# Patient Record
Sex: MALE | Race: WHITE | NOT HISPANIC OR LATINO | Employment: OTHER | URBAN - METROPOLITAN AREA
[De-identification: names, ages, dates, MRNs, and addresses within clinical notes are randomized per-mention and may not be internally consistent; named-entity substitution may affect disease eponyms.]

---

## 2017-01-17 ENCOUNTER — OFFICE VISIT (OUTPATIENT)
Dept: INTERNAL MEDICINE | Facility: MEDICAL CENTER | Age: 67
End: 2017-01-17
Payer: MEDICAID

## 2017-01-17 VITALS
HEIGHT: 70 IN | TEMPERATURE: 98.2 F | OXYGEN SATURATION: 96 % | RESPIRATION RATE: 16 BRPM | HEART RATE: 88 BPM | DIASTOLIC BLOOD PRESSURE: 77 MMHG | SYSTOLIC BLOOD PRESSURE: 119 MMHG | WEIGHT: 206 LBS | BODY MASS INDEX: 29.49 KG/M2

## 2017-01-17 DIAGNOSIS — F51.01 PRIMARY INSOMNIA: ICD-10-CM

## 2017-01-17 DIAGNOSIS — Z72.0 TOBACCO ABUSE: ICD-10-CM

## 2017-01-17 DIAGNOSIS — F41.9 ANXIETY: ICD-10-CM

## 2017-01-17 DIAGNOSIS — F32.A DEPRESSION, UNSPECIFIED DEPRESSION TYPE: ICD-10-CM

## 2017-01-17 DIAGNOSIS — R63.8 INCREASED BMI: ICD-10-CM

## 2017-01-17 DIAGNOSIS — F25.9 SCHIZOAFFECTIVE DISORDER, UNSPECIFIED TYPE (HCC): ICD-10-CM

## 2017-01-17 PROCEDURE — 99214 OFFICE O/P EST MOD 30 MIN: CPT | Mod: GC | Performed by: INTERNAL MEDICINE

## 2017-01-17 RX ORDER — NICOTINE 21 MG/24HR
1 PATCH, TRANSDERMAL 24 HOURS TRANSDERMAL EVERY 24 HOURS
Qty: 14 PATCH | Refills: 0 | Status: SHIPPED | OUTPATIENT
Start: 2017-01-17 | End: 2017-05-05

## 2017-01-17 RX ORDER — TRAZODONE HYDROCHLORIDE 50 MG/1
50 TABLET ORAL
Qty: 30 TAB | Refills: 3 | Status: SHIPPED | OUTPATIENT
Start: 2017-01-17 | End: 2017-11-22 | Stop reason: SDUPTHER

## 2017-01-17 NOTE — PROGRESS NOTES
Established Patient    Boyd presents today with the following:    CC: Follow up     HPI: Mr. Askew is a 66-year-old male with past medical history of depression, anxiety, schizoaffective disorder and osteomyelitis, COPD and tobacco abuse who presents today for follow up visit. His main complaint for today is insomnia. He is currently on trazodone 25 mg QHS and not helping with his insomnia. He stopped smoking but currently on the 21 mg nicotine patches and interested in downgrading to the 14 mg patches. Patient also feels he needs to talk to a psychologist about his mental problems. He denied any suicide or homicide ideation. Patient otherwise denied any complaints for today.   Off note patient recently admitted to ED because of COPD exacerbation and responded well to treatment with azithromycin, steroids and bronchodilators.     Patient Active Problem List    Diagnosis Date Noted   • Foot osteomyelitis, right (CMS-HCC) 06/21/2016     Priority: High   • Foot ulcer (CMS-HCC) 06/16/2016     Priority: Medium   • Acute sinusitis 10/31/2016   • PFO (patent foramen ovale) 07/06/2016   • Schizoaffective disorder (CMS-HCC) 06/16/2016   • Hypertension 02/16/2016   • Altered mental status 02/15/2016   • Depression 01/08/2016   • Dilated cardiomyopathy (CMS-HCC) 07/30/2015   • Acute systolic heart failure (CMS-HCC) 07/25/2015   • Mitral regurgitation 07/25/2015   • GERD (gastroesophageal reflux disease) 11/01/2013   • ED (erectile dysfunction) 03/08/2012   • Knee pain 03/08/2012   • Elevated fasting blood sugar 04/22/2011   • Anxiety 03/17/2010   • Chronic back pain 01/05/2010   • Venous insufficiency        Current Outpatient Prescriptions   Medication Sig Dispense Refill   • trazodone (DESYREL) 50 MG Tab Take 1 Tab by mouth every bedtime. 30 Tab 3   • nicotine (NICODERM) 14 MG/24HR PATCH 24 HR Apply 1 Patch to skin as directed every 24 hours. 14 Patch 0   • albuterol (VENTOLIN HFA) 108 (90 BASE) MCG/ACT Aero Soln  "inhalation aerosol Inhale 2 Puffs by mouth every four hours as needed for Shortness of Breath (wheezing and breathing). 1 Inhaler 2   • sertraline (ZOLOFT) 100 MG Tab TAKE TWO TABLETS BY MOUTH ONCE DAILY 60 Tab 4   • sildenafil citrate (VIAGRA) 25 MG Tab Take 1 Tab by mouth as needed for Erectile Dysfunction. 10 Tab 0   • gabapentin (NEURONTIN) 300 MG Cap 1 tab po qd for 1 day then 1 tab BID for 1 day then 1 tab PO TID 90 Cap 3   • Lactobacillus-Inulin ([facility] DIG[facility]) Cap Take 1 Cap by mouth every day with lunch. 30 Cap 4   • Specialty Vitamins Products (HEALTHY HEART COMPLEX) Tab Take  by mouth.     • buPROPion SR (WELLBUTRIN-SR) 100 MG TABLET SR 12 HR Take 1 Tab by mouth every day. 60 Tab 3     No current facility-administered medications for this visit.           Review of Systems:     Constitutional: Denies fevers, Denies weight changes  Eyes: Denies changes in vision, no eye pain  Ears/Nose/Throat/Mouth: Denies nasal congestion or sore throat   Cardiovascular: Denies chest pain or palpitations   Respiratory: Denies shortness of breath , Denies cough  Gastrointestinal/Hepatic: Denies abdominal pain, nausea, vomiting, diarrhea or constipation.  Genitourinary: Denies bladder dysfunction, dysuria or frequency  Musculoskeletal/Rheum: chronic back pain   Skin: Denies rash.  Neurological: Denies headache, confusion, memory loss or focal weakness/parasthesias  Psychiatric: denies mood disorder               /77 mmHg  Pulse 88  Temp(Src) 36.8 °C (98.2 °F)  Resp 16  Ht 1.778 m (5' 10\")  Wt 93.441 kg (206 lb)  BMI 29.56 kg/m2  SpO2 96%    Physical Exam   Constitutional:  Comfortable. No acute distress.   Eyes: Pupils are equal, round, and reactive to light. No scleral icterus.  Neck: Neck supple. No thyromegaly present.   Cardiovascular: Normal rate, regular rhythm and normal heart sounds.  Exam reveals no gallop and no friction rub.  No murmur heard.  Pulmonary/Chest: Lungs clear to ascultation " bilaterally. Breath sounds normal. No rhonchi, wheezes or crackles.   Musculoskeletal:   No deformity noted. no edema.   Lymphadenopathy: no cervical adenopathy  Neurological: alert and oriented to person, place, and time. Cranial nerves 2-12 grossly intact. No obvious motor or sensory deficits.  Extremities: No edema. No clubbing. No cyanosis. Distal pulses 2+ bilaterally.  Skin: No Rash. No cyanosis. Nails show no clubbing.      Assessment and Plan    1. Primary insomnia  - Initial insomnia.  - Acceptable Sleep hygiene.  - Currently on trazodone 25 QHS. Will increase dose to 50 QHS and titrate up as needed and tolerated.    2. Schizoaffective disorder, unspecified type (CMS-HCC)  3. Anxiety  4. Depression, unspecified depression type  - Patient currently on Zoloft and Bupropion  - No suicidal or homicidal ideation  - Will refer to behavioral health for counseling.    5. Tobacco abuse  - Patient quit smoking and currently on 21 mg nicotine patches.  - Will start on 14 mg patches.  - Counseling provided.        Signed by: Espinoza Doshi M.D.

## 2017-01-17 NOTE — MR AVS SNAPSHOT
"Boyd Askew   2017 10:00 AM   Office Visit   MRN: [id number]    Department:  [facility]   Dept Phone:  [phone]    Description:  Male : 1950   Provider:  Espinoza Doshi M.D.           Reason for Visit     Referral Needed psychologist/psychiatrist      Allergies as of 2017     No Known Allergies      You were diagnosed with     Primary insomnia   [391719]       Schizoaffective disorder, unspecified type (CMS-[facility])   [1548498]       Anxiety   [307346]       Depression, unspecified depression type   [0332911]       Tobacco abuse   [[id number]]         Vital Signs     Blood Pressure Pulse Temperature Respirations Height Weight    119/77 mmHg 88 36.8 °C (98.2 °F) 16 1.778 m (5' 10\") 93.441 kg (206 lb)    Body Mass Index Oxygen Saturation Smoking Status             29.56 kg/m2 96% Current Some Day Smoker         Basic Information     Date Of Birth Sex Race Ethnicity Preferred Language    1950 Male White Non- English      Problem List              ICD-10-CM Priority Class Noted - Resolved    Venous insufficiency I87.2   Unknown - Present    Chronic back pain M54.9, G89.29   2010 - Present    Anxiety F41.9   3/17/2010 - Present    Elevated fasting blood sugar R73.01   2011 - Present    ED (erectile dysfunction) N52.9   3/8/2012 - Present    Knee pain M25.569   3/8/2012 - Present    GERD (gastroesophageal reflux disease) K21.9   2013 - Present    Acute systolic heart failure (CMS-[facility]) I50.21   2015 - Present    Mitral regurgitation I34.0   2015 - Present    Dilated cardiomyopathy (CMS-HCC) I42.0   2015 - Present    Depression F32.9   2016 - Present    Altered mental status R41.82   2/15/2016 - Present    Hypertension I10   2016 - Present    Foot ulcer (CMS-HCC) L97.509 Medium  2016 - Present    Schizoaffective disorder (CMS-HCC) F25.9   2016 - Present    Foot osteomyelitis, right (CMS-HCC) M86.9 High  2016 - " Present    PFO (patent foramen ovale) Q21.1   7/6/2016 - Present    Acute sinusitis J01.90   10/31/2016 - Present      Health Maintenance        Date Due Completion Dates    IMM DTaP/Tdap/Td Vaccine (1 - Tdap) 8/18/1971 ---    IMM ZOSTER VACCINE 8/18/2012 ---    COLONOSCOPY 9/14/2019 9/14/2016            Current Immunizations     Influenza TIV (IM) 9/11/2016, 10/8/2015, 10/1/2014    Influenza Vaccine Quad Inj (Pf) 11/16/2015    Pneumococcal polysaccharide vaccine (PPSV-23) 7/22/2015  4:41 PM      Below and/or attached are the medications your provider expects you to take. Review all of your home medications and newly ordered medications with your provider and/or pharmacist. Follow medication instructions as directed by your provider and/or pharmacist. Please keep your medication list with you and share with your provider. Update the information when medications are discontinued, doses are changed, or new medications (including over-the-counter products) are added; and carry medication information at all times in the event of emergency situations     Allergies:  No Known Allergies          Medications  Valid as of: January 17, 2017 - 10:14 AM    Generic Name Brand Name Tablet Size Instructions for use    Albuterol Sulfate (Aero Soln) albuterol 108 (90 BASE) MCG/ACT Inhale 2 Puffs by mouth every four hours as needed for Shortness of Breath (wheezing and breathing).        BuPROPion HCl (TABLET SR 12 HR) WELLBUTRIN- MG Take 1 Tab by mouth every day.        Gabapentin (Cap) NEURONTIN 300 MG 1 tab po qd for 1 day then 1 tab BID for 1 day then 1 tab PO TID        Lactobacillus-Inulin (Cap) CULTURELLE DIGESTIVE [facility]  Take 1 Cap by mouth every day with lunch.        Nicotine (PATCH 24 HR) NICODERM 14 MG/24HR Apply 1 Patch to skin as directed every 24 hours.        Sertraline HCl (Tab) ZOLOFT 100 MG TAKE TWO TABLETS BY MOUTH ONCE DAILY        Sildenafil Citrate (Tab) VIAGRA 25 MG Take 1 Tab by mouth as needed for  Erectile Dysfunction.        Specialty Vitamins Products (Tab) HEALTHY HEART COMPLEX  Take  by mouth.        TraZODone HCl (Tab) DESYREL 50 MG Take 1 Tab by mouth every bedtime.        .                 Medicines prescribed today were sent to:     [id number] - [place], [place] - [address]    [address] [place] [address]    Phone: [phone] Fax: [phone]    Open 24 Hours?: No      Medication refill instructions:       If your prescription bottle indicates you have medication refills left, it is not necessary to call your provider’s office. Please contact your pharmacy and they will refill your medication.    If your prescription bottle indicates you do not have any refills left, you may request refills at any time through one of the following ways: The online [employer] system (except Urgent Care), by calling your provider’s office, or by asking your pharmacy to contact your provider’s office with a refill request. Medication refills are processed only during regular business hours and may not be available until the next business day. Your provider may request additional information or to have a follow-up visit with you prior to refilling your medication.   *Please Note: Medication refills are assigned a new Rx number when refilled electronically. Your pharmacy may indicate that no refills were authorized even though a new prescription for the same medication is available at the pharmacy. Please request the medicine by name with the pharmacy before contacting your provider for a refill.        Referral     A referral request has been sent to our patient care coordination department. Please allow 3-5 business days for us to process this request and contact you either by phone or mail. If you do not hear from us by the 5th business day, please call us at [phone].        Other Notes About Your Plan     Drug contract signed 9/23/14  Urine drug screen collected 12/17/14           [employer]  Access Code: Activation code not generated  Current MyChart Status: Active

## 2017-02-10 DIAGNOSIS — F51.01 PRIMARY INSOMNIA: ICD-10-CM

## 2017-02-10 NOTE — TELEPHONE ENCOUNTER
Last seen: 01/17/17 by Dr. Doshi  Next appt: None     Was the patient seen in the last year in this department? Yes   Does patient have an active prescription for medications requested? No   Received Request Via: Pharmacy      Pt has doubled dose was told he could do so by

## 2017-02-16 RX ORDER — TRAZODONE HYDROCHLORIDE 50 MG/1
50 TABLET ORAL 2 TIMES DAILY
Qty: 60 TAB | Refills: 0 | OUTPATIENT
Start: 2017-02-16

## 2017-02-25 DIAGNOSIS — G89.29 OTHER CHRONIC PAIN: ICD-10-CM

## 2017-02-27 RX ORDER — GABAPENTIN 300 MG/1
300 CAPSULE ORAL 3 TIMES DAILY
Qty: 90 CAP | Refills: 5 | Status: SHIPPED | OUTPATIENT
Start: 2017-02-27 | End: 2017-09-18 | Stop reason: SDUPTHER

## 2017-02-27 NOTE — TELEPHONE ENCOUNTER
Last seen: 1/17/17 by Dr. Doshi  Next appt: None    Was the patient seen in the last year in this department? Yes   Does patient have an active prescription for medications requested? No   Received Request Via: Pharmacy

## 2017-03-03 ENCOUNTER — OFFICE VISIT (OUTPATIENT)
Dept: INTERNAL MEDICINE | Facility: MEDICAL CENTER | Age: 67
End: 2017-03-03
Payer: MEDICAID

## 2017-03-03 ENCOUNTER — TELEPHONE (OUTPATIENT)
Dept: INTERNAL MEDICINE | Facility: MEDICAL CENTER | Age: 67
End: 2017-03-03

## 2017-03-03 VITALS
HEIGHT: 70 IN | TEMPERATURE: 98.3 F | BODY MASS INDEX: 30.58 KG/M2 | OXYGEN SATURATION: 96 % | DIASTOLIC BLOOD PRESSURE: 80 MMHG | WEIGHT: 213.6 LBS | SYSTOLIC BLOOD PRESSURE: 126 MMHG | HEART RATE: 89 BPM

## 2017-03-03 DIAGNOSIS — G89.29 CHRONIC PAIN OF RIGHT KNEE: ICD-10-CM

## 2017-03-03 DIAGNOSIS — M25.561 CHRONIC PAIN OF RIGHT KNEE: ICD-10-CM

## 2017-03-03 DIAGNOSIS — L98.9 ECZEMATOUS SKIN LESIONS: ICD-10-CM

## 2017-03-03 DIAGNOSIS — F17.209 NICOTINE DEPENDENCE WITH NICOTINE-INDUCED DISORDER, UNSPECIFIED NICOTINE PRODUCT TYPE: ICD-10-CM

## 2017-03-03 PROBLEM — F17.200 NICOTINE DEPENDENCE: Status: ACTIVE | Noted: 2017-03-03

## 2017-03-03 PROCEDURE — 99213 OFFICE O/P EST LOW 20 MIN: CPT | Mod: GE | Performed by: INTERNAL MEDICINE

## 2017-03-03 RX ORDER — TRIAMCINOLONE ACETONIDE 1 MG/G
CREAM TOPICAL
Qty: 1 TUBE | Refills: 1 | Status: SHIPPED | OUTPATIENT
Start: 2017-03-03 | End: 2017-05-05

## 2017-03-03 RX ORDER — QUETIAPINE FUMARATE 100 MG/1
100 TABLET, FILM COATED ORAL 4 TIMES DAILY PRN
COMMUNITY
End: 2017-05-05

## 2017-03-03 NOTE — PATIENT INSTRUCTIONS
F/u with pcp as scheduled  Soaked skin with luke warm water and then use a wash cloth to scrub gently, pat dry it and cover with thick vaseline gelly.    Eczema  Eczema, also called atopic dermatitis, is a skin disorder that causes inflammation of the skin. It causes a red rash and dry, scaly skin. The skin becomes very itchy. Eczema is generally worse during the cooler winter months and often improves with the warmth of summer. Eczema usually starts showing signs in infancy. Some children outgrow eczema, but it may last through adulthood.   CAUSES   The exact cause of eczema is not known, but it appears to run in families. People with eczema often have a family history of eczema, allergies, asthma, or hay fever. Eczema is not contagious.  Flare-ups of the condition may be caused by:   · Contact with something you are sensitive or allergic to.    · Stress.  SIGNS AND SYMPTOMS  · Dry, scaly skin.    · Red, itchy rash.    · Itchiness. This may occur before the skin rash and may be very intense.    DIAGNOSIS   The diagnosis of eczema is usually made based on symptoms and medical history.  TREATMENT   Eczema cannot be cured, but symptoms usually can be controlled with treatment and other strategies. A treatment plan might include:  · Controlling the itching and scratching.    ¨ Use over-the-counter antihistamines as directed for itching. This is especially useful at night when the itching tends to be worse.    ¨ Use over-the-counter steroid creams as directed for itching.    ¨ Avoid scratching. Scratching makes the rash and itching worse. It may also result in a skin infection (impetigo) due to a break in the skin caused by scratching.    · Keeping the skin well moisturized with creams every day. This will seal in moisture and help prevent dryness. Lotions that contain alcohol and water should be avoided because they can dry the skin.    · Limiting exposure to things that you are sensitive or allergic to (allergens).     · Recognizing situations that cause stress.    · Developing a plan to manage stress.    HOME CARE INSTRUCTIONS   · Only take over-the-counter or prescription medicines as directed by your health care provider.    · Do not use anything on the skin without checking with your health care provider.    · Keep baths or showers short (5 minutes) in warm (not hot) water. Use mild cleansers for bathing. These should be unscented. You may add nonperfumed bath oil to the bath water. It is best to avoid soap and bubble bath.    · Immediately after a bath or shower, when the skin is still damp, apply a moisturizing ointment to the entire body. This ointment should be a petroleum ointment. This will seal in moisture and help prevent dryness. The thicker the ointment, the better. These should be unscented.    · Keep fingernails cut short. Children with eczema may need to wear soft gloves or mittens at night after applying an ointment.    · Dress in clothes made of cotton or cotton blends. Dress lightly, because heat increases itching.    · A child with eczema should stay away from anyone with fever blisters or cold sores. The virus that causes fever blisters (herpes simplex) can cause a serious skin infection in children with eczema.  SEEK MEDICAL CARE IF:   · Your itching interferes with sleep.    · Your rash gets worse or is not better within 1 week after starting treatment.    · You see pus or soft yellow scabs in the rash area.    · You have a fever.    · You have a rash flare-up after contact with someone who has fever blisters.       This information is not intended to replace advice given to you by your health care provider. Make sure you discuss any questions you have with your health care provider.     Document Released: 12/15/2001 Document Revised: 10/08/2014 Document Reviewed: 07/21/2014  [employer] Interactive Patient Education ©2016 [employer]

## 2017-03-03 NOTE — MR AVS SNAPSHOT
"Boyd Askew   3/3/2017 11:15 AM   Office Visit   MRN: [id number]    Department:  [facility]   Dept Phone:  [phone]    Description:  Male : 1950   Provider:  Nisa Crocker M.D.           Reason for Visit     Knee Swelling Dr. Sesay pt     Wound Check     Nicotine Dependence Rx for gum       Allergies as of 3/3/2017     No Known Allergies      You were diagnosed with     Chronic pain of right knee   [4644636]       Nicotine dependence with nicotine-induced disorder, unspecified nicotine product type   [8349226]       Tobacco abuse   [531706]       Eczematous skin lesions   [325318]         Vital Signs     Blood Pressure Pulse Temperature Height Weight Body Mass Index    126/80 mmHg 89 36.8 °C (98.3 °F) 1.778 m (5' 10\") 96.888 kg (213 lb 9.6 oz) 30.65 kg/m2    Oxygen Saturation Smoking Status                96% Current Some Day Smoker          Basic Information     Date Of Birth Sex Race Ethnicity Preferred Language    1950 Male White Non- English      Problem List              ICD-10-CM Priority Class Noted - Resolved    Venous insufficiency I87.2   Unknown - Present    Chronic back pain M54.9, G89.29   2010 - Present    Anxiety F41.9   3/17/2010 - Present    Elevated fasting blood sugar R73.01   2011 - Present    ED (erectile dysfunction) N52.9   3/8/2012 - Present    Chronic pain of right knee M25.561, G89.29   3/8/2012 - Present    GERD (gastroesophageal reflux disease) K21.9   2013 - Present    Acute systolic heart failure (CMS-HCC) I50.21   2015 - Present    Mitral regurgitation I34.0   2015 - Present    Dilated cardiomyopathy (CMS-HCC) I42.0   2015 - Present    Depression F32.9   2016 - Present    Altered mental status R41.82   2/15/2016 - Present    Hypertension I10   2016 - Present    Foot ulcer (CMS-HCC) L97.509 Medium  2016 - Present    Schizoaffective disorder (CMS-HCC) F25.9   2016 - Present   " Foot osteomyelitis, right (CMS-HCC) M86.9 High  6/21/2016 - Present    PFO (patent foramen ovale) Q21.1   7/6/2016 - Present    Acute sinusitis J01.90   10/31/2016 - Present    Nicotine dependence F17.200   3/3/2017 - Present      Health Maintenance        Date Due Completion Dates    IMM DTaP/Tdap/Td Vaccine (1 - Tdap) 8/18/1971 ---    IMM ZOSTER VACCINE 8/18/2012 ---    COLONOSCOPY 9/14/2019 9/14/2016            Current Immunizations     Influenza TIV (IM) 9/11/2016, 10/8/2015, 10/1/2014    Influenza Vaccine Quad Inj (Pf) 11/16/2015    Pneumococcal polysaccharide vaccine (PPSV-23) 7/22/2015  4:41 PM      Below and/or attached are the medications your provider expects you to take. Review all of your home medications and newly ordered medications with your provider and/or pharmacist. Follow medication instructions as directed by your provider and/or pharmacist. Please keep your medication list with you and share with your provider. Update the information when medications are discontinued, doses are changed, or new medications (including over-the-counter products) are added; and carry medication information at all times in the event of emergency situations     Allergies:  No Known Allergies          Medications  Valid as of: March 03, 2017 - 12:05 PM    Generic Name Brand Name Tablet Size Instructions for use    Albuterol Sulfate (Aero Soln) albuterol 108 (90 BASE) MCG/ACT Inhale 2 Puffs by mouth every four hours as needed for Shortness of Breath (wheezing and breathing).        BuPROPion HCl (TABLET SR 12 HR) WELLBUTRIN- MG Take 1 Tab by mouth every day.        Gabapentin (Cap) NEURONTIN 300 MG Take 1 Cap by mouth 3 times a day.        Lactobacillus-Inulin (Cap) CULTURELLE DIGESTIVE [facility]  Take 1 Cap by mouth every day with lunch.        Nicotine (PATCH 24 HR) NICODERM 14 MG/24HR Apply 1 Patch to skin as directed every 24 hours.        Nicotine Polacrilex (Gum) NICORETTE 4 MG Use every day as needed basis         QUEtiapine Fumarate (Tab) SEROQUEL 100 MG Take 100 mg by mouth 4 times a day as needed.        Sertraline HCl (Tab) ZOLOFT 100 MG TAKE TWO TABLETS BY MOUTH ONCE DAILY        Sildenafil Citrate (Tab) VIAGRA 25 MG Take 1 Tab by mouth as needed for Erectile Dysfunction.        Specialty Vitamins Products (Tab) HEALTHY HEART COMPLEX  Take  by mouth.        TraZODone HCl (Tab) DESYREL 50 MG Take 1 Tab by mouth every bedtime.        Triamcinolone Acetonide (Cream) KENALOG 0.1 % Apply thin layer over the skin 2 times a dya.        .                 Medicines prescribed today were sent to:     [id number] - [place], [place] - [address]    [address] [place] [address]    Phone: [phone] Fax: [phone]    Open 24 Hours?: No      Medication refill instructions:       If your prescription bottle indicates you have medication refills left, it is not necessary to call your provider’s office. Please contact your pharmacy and they will refill your medication.    If your prescription bottle indicates you do not have any refills left, you may request refills at any time through one of the following ways: The online [employer] system (except Urgent Care), by calling your provider’s office, or by asking your pharmacy to contact your provider’s office with a refill request. Medication refills are processed only during regular business hours and may not be available until the next business day. Your provider may request additional information or to have a follow-up visit with you prior to refilling your medication.   *Please Note: Medication refills are assigned a new Rx number when refilled electronically. Your pharmacy may indicate that no refills were authorized even though a new prescription for the same medication is available at the pharmacy. Please request the medicine by name with the pharmacy before contacting your provider for a refill.        Referral     A referral request has been sent to our  patient care coordination department. Please allow 3-5 business days for us to process this request and contact you either by phone or mail. If you do not hear from us by the 5th business day, please call us at [phone].        Instructions    F/u with pcp as scheduled  Soaked skin with luke warm water and then use a wash cloth to scrub gently, pat dry it and cover with thick vaseline gelly.    Eczema  Eczema, also called atopic dermatitis, is a skin disorder that causes inflammation of the skin. It causes a red rash and dry, scaly skin. The skin becomes very itchy. Eczema is generally worse during the cooler winter months and often improves with the warmth of summer. Eczema usually starts showing signs in infancy. Some children outgrow eczema, but it may last through adulthood.   CAUSES   The exact cause of eczema is not known, but it appears to run in families. People with eczema often have a family history of eczema, allergies, asthma, or hay fever. Eczema is not contagious.  Flare-ups of the condition may be caused by:   · Contact with something you are sensitive or allergic to.    · Stress.  SIGNS AND SYMPTOMS  · Dry, scaly skin.    · Red, itchy rash.    · Itchiness. This may occur before the skin rash and may be very intense.    DIAGNOSIS   The diagnosis of eczema is usually made based on symptoms and medical history.  TREATMENT   Eczema cannot be cured, but symptoms usually can be controlled with treatment and other strategies. A treatment plan might include:  · Controlling the itching and scratching.    ¨ Use over-the-counter antihistamines as directed for itching. This is especially useful at night when the itching tends to be worse.    ¨ Use over-the-counter steroid creams as directed for itching.    ¨ Avoid scratching. Scratching makes the rash and itching worse. It may also result in a skin infection (impetigo) due to a break in the skin caused by scratching.    · Keeping the skin well moisturized  with creams every day. This will seal in moisture and help prevent dryness. Lotions that contain alcohol and water should be avoided because they can dry the skin.    · Limiting exposure to things that you are sensitive or allergic to (allergens).    · Recognizing situations that cause stress.    · Developing a plan to manage stress.    HOME CARE INSTRUCTIONS   · Only take over-the-counter or prescription medicines as directed by your health care provider.    · Do not use anything on the skin without checking with your health care provider.    · Keep baths or showers short (5 minutes) in warm (not hot) water. Use mild cleansers for bathing. These should be unscented. You may add nonperfumed bath oil to the bath water. It is best to avoid soap and bubble bath.    · Immediately after a bath or shower, when the skin is still damp, apply a moisturizing ointment to the entire body. This ointment should be a petroleum ointment. This will seal in moisture and help prevent dryness. The thicker the ointment, the better. These should be unscented.    · Keep fingernails cut short. Children with eczema may need to wear soft gloves or mittens at night after applying an ointment.    · Dress in clothes made of cotton or cotton blends. Dress lightly, because heat increases itching.    · A child with eczema should stay away from anyone with fever blisters or cold sores. The virus that causes fever blisters (herpes simplex) can cause a serious skin infection in children with eczema.  SEEK MEDICAL CARE IF:   · Your itching interferes with sleep.    · Your rash gets worse or is not better within 1 week after starting treatment.    · You see pus or soft yellow scabs in the rash area.    · You have a fever.    · You have a rash flare-up after contact with someone who has fever blisters.       This information is not intended to replace advice given to you by your health care provider. Make sure you discuss any questions you have with  your health care provider.     Document Released: 12/15/2001 Document Revised: 10/08/2014 Document Reviewed: 07/21/2014  Elsevier Interactive Patient Education ©2016 [employer]         Other Notes About Your Plan     Drug contract signed 9/23/14  Urine drug screen collected 12/17/14           MyChart Access Code: Activation code not generated  Current [facility] Status: Active

## 2017-03-03 NOTE — PROGRESS NOTES
Established Patient    Boyd presents today with the following:    CC: Knee swelling and skin lesion  HPI: 65 yo  with hx of schizophrenia, nicotine dependence came to today for worsening R knee swelling. He mentioned that he had an arthroscopy with s/p removal of meniscus >10 yrs ago.  was the surgeon. He gets on and off flare of the swelling since then. For last 2 yrs it got worse and recently it got bigger and painful. Swelling worsen with walking. No hx of recent trauma or fall. He has not following any orthopedic physician at the moment.    He also c/o worsening of eczema in the L hand since winter. It has been going on for last 20 yrs. He used to see  before for this and  he used to take one oral medication (ezlesta?) and steroid cream for this.    He requests for nicotine gum this visit. He still smoking 1-2 cigarettes a day.    Patient Active Problem List    Diagnosis Date Noted   • Foot osteomyelitis, right (CMS-HCC) 06/21/2016     Priority: High   • Foot ulcer (CMS-HCC) 06/16/2016     Priority: Medium   • Nicotine dependence 03/03/2017   • Acute sinusitis 10/31/2016   • PFO (patent foramen ovale) 07/06/2016   • Schizoaffective disorder (CMS-HCC) 06/16/2016   • Hypertension 02/16/2016   • Altered mental status 02/15/2016   • Depression 01/08/2016   • Dilated cardiomyopathy (CMS-HCC) 07/30/2015   • Acute systolic heart failure (CMS-HCC) 07/25/2015   • Mitral regurgitation 07/25/2015   • GERD (gastroesophageal reflux disease) 11/01/2013   • ED (erectile dysfunction) 03/08/2012   • Chronic pain of right knee 03/08/2012   • Elevated fasting blood sugar 04/22/2011   • Anxiety 03/17/2010   • Chronic back pain 01/05/2010   • Venous insufficiency        Current Outpatient Prescriptions   Medication Sig Dispense Refill   • quetiapine (SEROQUEL) 100 MG Tab Take 100 mg by mouth 4 times a day as needed.     • gabapentin (NEURONTIN) 300 MG Cap Take 1 Cap by mouth 3 times a day. 90  "Cap 5   • trazodone (DESYREL) 50 MG Tab Take 1 Tab by mouth every bedtime. 30 Tab 3   • nicotine (NICODERM) 14 MG/24HR PATCH 24 HR Apply 1 Patch to skin as directed every 24 hours. 14 Patch 0   • sertraline (ZOLOFT) 100 MG Tab TAKE TWO TABLETS BY MOUTH ONCE DAILY (Patient taking differently: TAKE 1 1/2  TABLETS BY MOUTH ONCE DAILY) 60 Tab 4   • buPROPion SR (WELLBUTRIN-SR) 100 MG TABLET SR 12 HR Take 1 Tab by mouth every day. 60 Tab 3   • albuterol (VENTOLIN HFA) 108 (90 BASE) MCG/ACT Aero Soln inhalation aerosol Inhale 2 Puffs by mouth every four hours as needed for Shortness of Breath (wheezing and breathing). 1 Inhaler 2   • sildenafil citrate (VIAGRA) 25 MG Tab Take 1 Tab by mouth as needed for Erectile Dysfunction. 10 Tab 0   • Lactobacillus-Inulin ([facility] DIGESTIVE [facility]) Cap Take 1 Cap by mouth every day with lunch. 30 Cap 4   • Specialty Vitamins Products (HEALTHY HEART COMPLEX) Tab Take  by mouth.       No current facility-administered medications for this visit.       ROS: As per HPI.     /80 mmHg  Pulse 89  Temp(Src) 36.8 °C (98.3 °F)  Ht 1.778 m (5' 10\")  Wt 96.888 kg (213 lb 9.6 oz)  BMI 30.65 kg/m2  SpO2 96%    Physical Exam   Constitutional:  Well developed  male. No distress.   Eyes: Pupils are equal, round, and reactive to light.   Neck: Neck supple.    Cardiovascular: Normal rate, regular rhythm and normal heart sounds.  Exam reveals no gallop and no friction rub.  No murmur heard.  Pulmonary/Chest: Breath sounds normal.   Musculoskeletal:   R Knee: Swollen, non tender Knee joint, Decreased ROM, especially flexion, No erythema in the overlying skin present.  Neurological: alert and oriented to person, place, and time.  Skin: Presence of dry, cracked skin with wart in the Left palm.      Assessment and Plan    1. Chronic pain of right knee  S/p meniscal removal >10 yrs back as per pt  Orthopedic surgeon retired.  No sign of infection on exam, decreased ROM.  Plan:  Ordered " ortho referral  Knee brace on walking  Advised OTC tylenol 650 mg q8h prn for pain or topical gel.    2. Nicotine dependence with nicotine-induced disorder, unspecified nicotine product type  Long hx of nicotine dependence  Still smoking, but cut down to 1-2 sticks/day  Ordered nicotine gum on request today    3. Eczematous skin lesions  Involved part: L hand  Plan:  Prescribed topical sterid: triamcinolone 0.1% cream  Advised to try home remedy: soaking hand in luke warm water with gentle rub w wash cloth and pat dry followed by greeze skin with Vaseline.  Dermatology referral made.    Followup: No Follow-up on file.      Signed by: Nisa Crocker M.D.

## 2017-03-03 NOTE — TELEPHONE ENCOUNTER
Outside labs reviewed including TSH which is normal and Vitamin D levels that is slightly decreased. I assume labs were reviewed and addressed by ordering provider[facility] (could not find any documentation about the reason). Patient will need to be on Vit D supplements which we will order if it is not ordered by Dr. Brunner.

## 2017-03-13 ENCOUNTER — TELEPHONE (OUTPATIENT)
Dept: INTERNAL MEDICINE | Facility: MEDICAL CENTER | Age: 67
End: 2017-03-13

## 2017-03-13 NOTE — TELEPHONE ENCOUNTER
1. Caller Name: Shikha from orthopedic office                       Call Back Number: [phone]    2. Message:  is not willing to see the patient if he is homeless they need to make sure he has transportation and if pt does get surgery he has to be in a clean and safe environment for post -op care     3. Patient approves office to leave a detailed voicemail/MyChart message: yes

## 2017-03-13 NOTE — Clinical Note
March 13, 2017         Boyd Askew  [address] [facility] [address]        Dear Boyd:      We were contacted by [facility]  About your recent referral. They want to make sure you have an appropriate environment in case you needed surgery before scheduling the appointment. They recommended that you call [facility] (P: [phone]  Jessi Delcid) to get some help in this regard.  Please let us know if we can be of any help.        Sincerely,      Espinoza Doshi M.D.    Electronically Signed

## 2017-03-13 NOTE — TELEPHONE ENCOUNTER
Contacted Shikha, the  for [employer]. They are concerned about the postoperative period care in case the patient needs surgery as he is homeless now. Tried to reach patient and his wife over the phone to explore situation but could not. Shikha recommended referring patient to [facility] (April Steward P: [phone]) please try to notify patient. A letter with the above info will be generated to mail to patient in case of inability to reach by phone.

## 2017-03-17 ENCOUNTER — TELEPHONE (OUTPATIENT)
Dept: INTERNAL MEDICINE | Facility: MEDICAL CENTER | Age: 67
End: 2017-03-17

## 2017-03-17 DIAGNOSIS — G89.29 CHRONIC PAIN OF RIGHT KNEE: ICD-10-CM

## 2017-03-17 DIAGNOSIS — M25.561 CHRONIC PAIN OF RIGHT KNEE: ICD-10-CM

## 2017-03-17 NOTE — TELEPHONE ENCOUNTER
1. Caller Name: pt                      Call Back Number: [phone] (home)     2. Message:  requesting pt to get order of right knee x-ray     3. Patient approves office to leave a detailed voicemail/MyChart message: yes      Pt has called neurosurgeon back and let them know he is not homeless and would like to proceed with appt

## 2017-03-21 NOTE — TELEPHONE ENCOUNTER
Pt called and L/M asking if this was ordered. I called and L/M to let him know it was ordered on 3/17/17 and he can walk in to have done at any [facility] location or call back if it needs to be faxed outside of [facility].

## 2017-04-10 ENCOUNTER — APPOINTMENT (OUTPATIENT)
Dept: LAB | Facility: MEDICAL CENTER | Age: 67
End: 2017-04-10
Attending: PSYCHIATRY & NEUROLOGY
Payer: MEDICAID

## 2017-04-26 NOTE — PROGRESS NOTES
Quick Note:    Xray of right knee showing osteoarthritis of right patellofemoral joint. Xray was requested by ortho, Dr. Padilla. Will wait for his recommendations. Meanwhile will have the patient continue OTC pain meds like ibuprofen and tylenol. Patient notified through my chart.  ______

## 2017-05-05 ENCOUNTER — OFFICE VISIT (OUTPATIENT)
Dept: INTERNAL MEDICINE | Facility: MEDICAL CENTER | Age: 67
End: 2017-05-05
Payer: MEDICAID

## 2017-05-05 VITALS
HEIGHT: 70 IN | OXYGEN SATURATION: 96 % | HEART RATE: 87 BPM | BODY MASS INDEX: 30.92 KG/M2 | SYSTOLIC BLOOD PRESSURE: 120 MMHG | DIASTOLIC BLOOD PRESSURE: 77 MMHG | TEMPERATURE: 98.8 F | WEIGHT: 216 LBS | RESPIRATION RATE: 18 BRPM

## 2017-05-05 DIAGNOSIS — E78.5 DYSLIPIDEMIA: ICD-10-CM

## 2017-05-05 DIAGNOSIS — G89.29 CHRONIC PAIN OF RIGHT KNEE: ICD-10-CM

## 2017-05-05 DIAGNOSIS — L40.0 PLAQUE PSORIASIS: ICD-10-CM

## 2017-05-05 DIAGNOSIS — F32.A ANXIETY AND DEPRESSION: ICD-10-CM

## 2017-05-05 DIAGNOSIS — Z87.39 HISTORY OF OSTEOMYELITIS: ICD-10-CM

## 2017-05-05 DIAGNOSIS — M54.50 CHRONIC MIDLINE LOW BACK PAIN WITHOUT SCIATICA: ICD-10-CM

## 2017-05-05 DIAGNOSIS — M25.561 CHRONIC PAIN OF RIGHT KNEE: ICD-10-CM

## 2017-05-05 DIAGNOSIS — R73.03 PREDIABETES: ICD-10-CM

## 2017-05-05 DIAGNOSIS — G89.29 CHRONIC MIDLINE LOW BACK PAIN WITHOUT SCIATICA: ICD-10-CM

## 2017-05-05 DIAGNOSIS — Z72.0 TOBACCO ABUSE: ICD-10-CM

## 2017-05-05 DIAGNOSIS — F41.9 ANXIETY AND DEPRESSION: ICD-10-CM

## 2017-05-05 PROCEDURE — 99214 OFFICE O/P EST MOD 30 MIN: CPT | Mod: GC | Performed by: INTERNAL MEDICINE

## 2017-05-05 RX ORDER — BETAMETHASONE DIPROPIONATE 0.05 %
OINTMENT (GRAM) TOPICAL
Qty: 3 TUBE | Refills: 4 | Status: SHIPPED | OUTPATIENT
Start: 2017-05-05 | End: 2018-10-22

## 2017-05-05 RX ORDER — SERTRALINE HYDROCHLORIDE 100 MG/1
100 TABLET, FILM COATED ORAL DAILY
Qty: 60 TAB | Refills: 4
Start: 2017-05-05 | End: 2017-10-25 | Stop reason: SDUPTHER

## 2017-05-05 RX ORDER — IBUPROFEN 600 MG/1
600 TABLET ORAL EVERY 8 HOURS PRN
Qty: 30 TAB | Refills: 3 | Status: SHIPPED | OUTPATIENT
Start: 2017-05-05 | End: 2017-06-23 | Stop reason: SDUPTHER

## 2017-05-05 NOTE — MR AVS SNAPSHOT
"        Boyd Askew   2017 8:30 AM   Office Visit   MRN: [id number]    Department:  [facility]   Dept Phone:  [phone]    Description:  Male : 1950   Provider:  Espinoza Doshi M.D.           Reason for Visit     Hand Pain pain both x 5 months      Allergies as of 2017     No Known Allergies      You were diagnosed with     Plaque psoriasis   [803936]       History of osteomyelitis   [007768]       Anxiety and depression   [186845]       Chronic midline low back pain without sciatica   [3037677]       Prediabetes   [053406]       Chronic pain of right knee   [8844190]       Tobacco abuse   [634464]       Dyslipidemia   [725957]         Vital Signs     Blood Pressure Pulse Temperature Respirations Height Weight    120/77 mmHg 87 37.1 °C (98.8 °F) 18 1.778 m (5' 10\") 97.977 kg (216 lb)    Body Mass Index Oxygen Saturation Smoking Status             30.99 kg/m2 96% Current Some Day Smoker         Basic Information     Date Of Birth Sex Race Ethnicity Preferred Language    1950 Male White Non- English      Problem List              ICD-10-CM Priority Class Noted - Resolved    Venous insufficiency I87.2   Unknown - Present    Chronic back pain M54.9, G89.29   2010 - Present    ED (erectile dysfunction) N52.9   3/8/2012 - Present    Chronic pain of right knee M25.561, G89.29   3/8/2012 - Present    GERD (gastroesophageal reflux disease) K21.9   2013 - Present    Acute systolic heart failure (CMS-HCC) I50.21   2015 - Present    Mitral regurgitation I34.0   2015 - Present    Dilated cardiomyopathy (CMS-[facility]) I42.0   2015 - Present    Hypertension I10   2016 - Present    Schizoaffective disorder (CMS-HCC) F25.9   2016 - Present    PFO (patent foramen ovale) Q21.1   2016 - Present    Nicotine dependence F17.200   3/3/2017 - Present    History of osteomyelitis Z87.39   2017 - Present    Anxiety and depression F41.9, F32.9   " 5/5/2017 - Present    Prediabetes R73.03   5/5/2017 - Present    Dyslipidemia E78.5   5/5/2017 - Present      Health Maintenance        Date Due Completion Dates    IMM DTaP/Tdap/Td Vaccine (1 - Tdap) 8/18/1971 ---    IMM ZOSTER VACCINE 8/18/2012 ---    COLONOSCOPY 9/14/2019 9/14/2016            Current Immunizations     Influenza TIV (IM) 9/11/2016, 10/8/2015, 10/1/2014    Influenza Vaccine Quad Inj (Pf) 11/16/2015    Pneumococcal polysaccharide vaccine (PPSV-23) 7/22/2015  4:41 PM      Below and/or attached are the medications your provider expects you to take. Review all of your home medications and newly ordered medications with your provider and/or pharmacist. Follow medication instructions as directed by your provider and/or pharmacist. Please keep your medication list with you and share with your provider. Update the information when medications are discontinued, doses are changed, or new medications (including over-the-counter products) are added; and carry medication information at all times in the event of emergency situations     Allergies:  No Known Allergies          Medications  Valid as of: May 05, 2017 -  9:36 AM    Generic Name Brand Name Tablet Size Instructions for use    Betamethasone Dipropionate (Ointment) DIPROLENE 0.05 % Apply to affected area 2 times a day        BuPROPion HCl (TABLET SR 12 HR) WELLBUTRIN- MG Take 1 Tab by mouth every day.        Gabapentin (Cap) NEURONTIN 300 MG Take 1 Cap by mouth 3 times a day.        Ibuprofen (Tab) MOTRIN 600 MG Take 1 Tab by mouth every 8 hours as needed for Moderate Pain.        Lactobacillus-Inulin (Cap) CULTURELLE DIGESTIVE HEALTH  Take 1 Cap by mouth every day with lunch.        Nicotine Polacrilex (Gum) NICORETTE 4 MG Use every day as needed basis        FJ-Gammdupbrtv-Akllt-DM   Take  by mouth.        Sertraline HCl (Tab) ZOLOFT 100 MG Take 1 Tab by mouth every day.        Sildenafil Citrate (Tab) VIAGRA 25 MG Take 1 Tab by mouth as needed  for Erectile Dysfunction.        Specialty Vitamins Products (Tab) HEALTHY HEART COMPLEX  Take  by mouth.        TraZODone HCl (Tab) DESYREL 50 MG Take 1 Tab by mouth every bedtime.        .                 Medicines prescribed today were sent to:     [id number] - [place], [place] - [address]    [address] [place] [address]    Phone: [phone] Fax: [phone]    Open 24 Hours?: No      Medication refill instructions:       If your prescription bottle indicates you have medication refills left, it is not necessary to call your provider’s office. Please contact your pharmacy and they will refill your medication.    If your prescription bottle indicates you do not have any refills left, you may request refills at any time through one of the following ways: The online [facility] system (except Urgent Care), by calling your provider’s office, or by asking your pharmacy to contact your provider’s office with a refill request. Medication refills are processed only during regular business hours and may not be available until the next business day. Your provider may request additional information or to have a follow-up visit with you prior to refilling your medication.   *Please Note: Medication refills are assigned a new Rx number when refilled electronically. Your pharmacy may indicate that no refills were authorized even though a new prescription for the same medication is available at the pharmacy. Please request the medicine by name with the pharmacy before contacting your provider for a refill.        Other Notes About Your Plan     Drug contract signed 9/23/14  Urine drug screen collected 12/17/14           [facility] Access Code: Activation code not generated  Current [facility] Status: Active          Quit Tobacco Information     Do you want to quit using tobacco?    Quitting tobacco decreases risks of cancer, heart and lung disease, increases life expectancy, improves sense of taste and smell, and  increases spending money, among other benefits.    If you are thinking about quitting, we can help.  • [facility] Quit Tobacco Program: [phone]  o Program occurs weekly for four weeks and includes pharmacist consultation on products to support quitting smoking or chewing tobacco. A provider referral is needed for pharmacist consultation.  • Tobacco Users Help Hotline: 7-952-QUIT-NOW (994-2097) or https://[place].quitlogix.org/  o Free, confidential telephone and online coaching for [place] residents. Sessions are designed on a schedule that is convenient for you. Eligible clients receive free nicotine replacement therapy.  • Nationally: www.smokefree.gov  o Information and professional assistance to support both immediate and long-term needs as you become, and remain, a non-smoker. Smokefree.gov allows you to choose the help that best fits your needs.

## 2017-05-05 NOTE — PROGRESS NOTES
Established Patient    Boyd presents today with the following:    CC: Follow-up visit    HPI: Mr. Askew is a 66-year-old male with past medical history of depression, anxiety, schizoaffective disorder, osteomyelitis, COPD and tobacco abuse who presents today for follow up visit. Patient was last seen in our clinic about 2 months ago for right-sided chronic knee pain and skin lesions affecting his palms. He was referred to orthopedist by that time and he has an upcoming appointment by the end of this month. Still has some pain and asking for something for the pain. Denied any swelling, redness or weakness. He is also complaining of thick skin lesions affecting the palm of his left hand mostly and some on the right hand. He was following with a dermatologist and diagnosed with plaque psoriasis and was placed on topical steroids and otezla. No lesions affecting any other parts of his body. He was referred to dermatology during last visit but couldn't get seen by anybody as no dermatologist in the area that accepts his insurance. Patient was also seen by psychiatry since last visit and adjustment was made to his medications that seems to be working for him. He denied any suicidal or homicidal ideation. Patient was successfully able To quit smoking and currently using nicotine gums.      Patient Active Problem List    Diagnosis Date Noted   • History of osteomyelitis 05/05/2017   • Anxiety and depression 05/05/2017   • Prediabetes 05/05/2017   • Dyslipidemia 05/05/2017   • Nicotine dependence 03/03/2017   • PFO (patent foramen ovale) 07/06/2016   • Schizoaffective disorder (CMS-HCC) 06/16/2016   • Hypertension 02/16/2016   • Dilated cardiomyopathy (CMS-HCC) 07/30/2015   • Acute systolic heart failure (CMS-HCC) 07/25/2015   • Mitral regurgitation 07/25/2015   • GERD (gastroesophageal reflux disease) 11/01/2013   • ED (erectile dysfunction) 03/08/2012   • Chronic pain of right knee 03/08/2012   • Chronic back pain  "01/05/2010   • Venous insufficiency        Current Outpatient Prescriptions   Medication Sig Dispense Refill   • KC-Lomvmajwxir-Pvfve-DM (RESPERAL PO) Take  by mouth.     • sertraline (ZOLOFT) 100 MG Tab Take 1 Tab by mouth every day. 60 Tab 4   • betamethasone dipropionate (DIPROLENE) 0.05 % Ointment Apply to affected area 2 times a day 3 Tube 4   • ibuprofen (MOTRIN) 600 MG Tab Take 1 Tab by mouth every 8 hours as needed for Moderate Pain. 30 Tab 3   • nicotine polacrilex (NICORETTE) 4 MG gum Use every day as needed basis 270 Each 3   • gabapentin (NEURONTIN) 300 MG Cap Take 1 Cap by mouth 3 times a day. 90 Cap 5   • trazodone (DESYREL) 50 MG Tab Take 1 Tab by mouth every bedtime. 30 Tab 3   • Lactobacillus-Inulin ([facility] DIGESTIVE [facility]) Cap Take 1 Cap by mouth every day with lunch. 30 Cap 4   • buPROPion SR (WELLBUTRIN-SR) 100 MG TABLET SR 12 HR Take 1 Tab by mouth every day. 60 Tab 3   • sildenafil citrate (VIAGRA) 25 MG Tab Take 1 Tab by mouth as needed for Erectile Dysfunction. 10 Tab 0   • Specialty Vitamins Products (HEALTHY HEART COMPLEX) Tab Take  by mouth.       No current facility-administered medications for this visit.           Review of Systems:     Constitutional: Denies fevers, Denies weight changes  Eyes: Denies changes in vision, no eye pain  Ears/Nose/Throat/Mouth: Denies nasal congestion or sore throat   Cardiovascular: Denies chest pain or palpitations   Respiratory: Denies shortness of breath , Denies cough  Gastrointestinal/Hepatic: Denies abdominal pain, nausea, vomiting, diarrhea or constipation.  Genitourinary: Denies bladder dysfunction, dysuria or frequency  Musculoskeletal/Rheum: As per history of present illness  Skin: Denies rash.  Neurological: Denies headache, confusion, memory loss or focal weakness/parasthesias  Psychiatric: denies mood disorder               /77 mmHg  Pulse 87  Temp(Src) 37.1 °C (98.8 °F)  Resp 18  Ht 1.778 m (5' 10\")  Wt 97.977 kg (216 lb)  " BMI 30.99 kg/m2  SpO2 96%    Physical Exam   Constitutional:  Comfortable. No acute distress.   Eyes: Pupils are equal, round, and reactive to light. No scleral icterus.  Neck: Neck supple. No thyromegaly present.   Cardiovascular: Normal rate, regular rhythm and normal heart sounds.  Exam reveals no gallop and no friction rub.  No murmur heard.  Pulmonary/Chest: Lungs clear to ascultation bilaterally. Breath sounds normal. No rhonchi, wheezes or crackles.   Musculoskeletal:   No deformity noted. no edema.   Lymphadenopathy: no cervical adenopathy  Neurological: alert and oriented to person, place, and time.  No obvious motor or sensory deficits.  Extremities: No edema. No clubbing. No cyanosis. Distal pulses 2+ bilaterally. Right knee exhibiting pain to flexion and extension movements. No swelling or redness noted. He has thick white skin plaque affecting his left palm and another small one affecting his right palm and some on his inner thumps.  Skin: No Rash. No cyanosis. Nails show no clubbing.      Assessment and Plan    1. Plaque psoriasis  -Patient was seen by a dermatologist previously and diagnosis was confirmed by biopsy.  -He was previously on otezla and topical steroids.  -Unable to refer to dermatology due to insurance problems.  -We'll start on betamethasone topical.  -Patient is in the process of applying for Medicare. If he gets approved then will refer him to dermatology again.  -We'll hold on restarting otezla as it might worsen his depression.  - betamethasone dipropionate (DIPROLENE) 0.05 % Ointment; Apply to affected area 2 times a day  Dispense: 3 Tube; Refill: 4    2. History of osteomyelitis  -Resolved and no signs of recurrence.    3. Anxiety and depression  -Patient's following with psychiatry.   -He is currently on Wellbutrin and Zoloft. We'll continue for now.  -He denied any suicidal or homicidal ideation.    4. Chronic midline low back pain without sciatica  -Secondary to degenerative  disease.  - ibuprofen (MOTRIN) 600 MG Tab; Take 1 Tab by mouth every 8 hours as needed for Moderate Pain.  Dispense: 30 Tab; Refill: 3    5. Prediabetes  -His hemoglobin A1c was 5.8.  -Advised about healthy lifestyle including exercise and diet.  -We'll recheck A1c next visit.    6. Chronic pain of right knee  -Patient is scheduled to see orthopedist.  -he has severe arthritis based on the x-ray images.  -Pain management with ibuprofen as needed.  - ibuprofen (MOTRIN) 600 MG Tab; Take 1 Tab by mouth every 8 hours as needed for Moderate Pain.  Dispense: 30 Tab; Refill: 3    7. Tobacco abuse  -Patient successfully quit smoking and currently on nicotine gums.  -he is also on Wellbutrin.  -Counseling provided.  -Advised to taper himself off of the nicotine gums as he is currently on high-dose.            Signed by: Espinoza Doshi M.D.

## 2017-06-01 ENCOUNTER — TELEPHONE (OUTPATIENT)
Dept: INTERNAL MEDICINE | Facility: MEDICAL CENTER | Age: 67
End: 2017-06-01

## 2017-06-01 NOTE — TELEPHONE ENCOUNTER
Orthopedics not reviewed.  Summary:  Impression:  1- meniscal tear  2- osteoarthritis    Plan is to perform right knee arthroscopy.

## 2017-09-18 ENCOUNTER — OFFICE VISIT (OUTPATIENT)
Dept: INTERNAL MEDICINE | Facility: MEDICAL CENTER | Age: 67
End: 2017-09-18
Payer: MEDICARE

## 2017-09-18 VITALS
HEIGHT: 70 IN | HEART RATE: 80 BPM | DIASTOLIC BLOOD PRESSURE: 84 MMHG | OXYGEN SATURATION: 96 % | BODY MASS INDEX: 32.24 KG/M2 | SYSTOLIC BLOOD PRESSURE: 130 MMHG | TEMPERATURE: 97.2 F | RESPIRATION RATE: 19 BRPM | WEIGHT: 225.2 LBS

## 2017-09-18 DIAGNOSIS — Z51.81 THERAPEUTIC DRUG MONITORING: ICD-10-CM

## 2017-09-18 DIAGNOSIS — F25.9 SCHIZOAFFECTIVE DISORDER, UNSPECIFIED TYPE (HCC): ICD-10-CM

## 2017-09-18 DIAGNOSIS — G89.29 CHRONIC MIDLINE LOW BACK PAIN WITHOUT SCIATICA: ICD-10-CM

## 2017-09-18 DIAGNOSIS — R73.03 PREDIABETES: ICD-10-CM

## 2017-09-18 DIAGNOSIS — E78.5 HYPERLIPIDEMIA, UNSPECIFIED HYPERLIPIDEMIA TYPE: ICD-10-CM

## 2017-09-18 DIAGNOSIS — Z72.0 TOBACCO ABUSE: ICD-10-CM

## 2017-09-18 DIAGNOSIS — Z00.00 PREVENTATIVE HEALTH CARE: ICD-10-CM

## 2017-09-18 DIAGNOSIS — G89.29 CHRONIC PAIN OF RIGHT KNEE: ICD-10-CM

## 2017-09-18 DIAGNOSIS — M25.561 CHRONIC PAIN OF RIGHT KNEE: ICD-10-CM

## 2017-09-18 DIAGNOSIS — M54.50 CHRONIC MIDLINE LOW BACK PAIN WITHOUT SCIATICA: ICD-10-CM

## 2017-09-18 LAB
HBA1C MFR BLD: 5.9 % (ref ?–5.8)
INT CON NEG: NEGATIVE
INT CON POS: POSITIVE

## 2017-09-18 PROCEDURE — 99214 OFFICE O/P EST MOD 30 MIN: CPT | Mod: GC | Performed by: INTERNAL MEDICINE

## 2017-09-18 PROCEDURE — 83036 HEMOGLOBIN GLYCOSYLATED A1C: CPT | Performed by: INTERNAL MEDICINE

## 2017-09-18 RX ORDER — RISPERIDONE 2 MG/1
2 TABLET ORAL 2 TIMES DAILY
COMMUNITY
End: 2017-10-25 | Stop reason: SDUPTHER

## 2017-09-18 RX ORDER — GABAPENTIN 300 MG/1
300 CAPSULE ORAL 3 TIMES DAILY
Qty: 90 CAP | Refills: 5 | Status: SHIPPED | OUTPATIENT
Start: 2017-09-18 | End: 2017-12-18 | Stop reason: SDUPTHER

## 2017-09-18 ASSESSMENT — PAIN SCALES - GENERAL: PAINLEVEL: 8=MODERATE-SEVERE PAIN

## 2017-09-18 ASSESSMENT — PATIENT HEALTH QUESTIONNAIRE - PHQ9: CLINICAL INTERPRETATION OF PHQ2 SCORE: 0

## 2017-09-18 NOTE — PATIENT INSTRUCTIONS

## 2017-09-18 NOTE — PROGRESS NOTES
Established Patient    Boyd presents today with the following:    CC: Follow-up on chronic problems including chronic back and knee pain and plaque psoriasis    HPI: Patient is a 67-year-old male with past medical history of depression, anxiety, schizoaffective disorder, osteoarthritis, tobacco abuse, chronic back pain, chronic knee pain and plaque psoriasis who presents today for follow-up visit. The patient denied any active complaints for today. Regarding his plaque psoriasis, he was started on topical betamethasone during last visit and his rash is improving slowly. Regarding his anxiety and depression, his symptoms are stable and he denied any suicidal or homicidal ideation. He is following with psychiatry and currently on Wellbutrin and Zoloft. Regarding his schizoaffective disorders, the symptoms are stable and is currently on the resperidol. Patient was also noted to be prediabetic on previous labs with hemoglobin A1c of 5.8 and he is trying lifestyle changes with no significant improvement. He is noted to be gaining weight. As far as his right knee pain, Patient is following with orthopedics and the plan is to do arthroscopy. Regarding his tobacco abuse patient is currently down to 2 cigarettes per day. Unwilling to quit at this point.The patient otherwise denied any complaints for today.    Patient Active Problem List    Diagnosis Date Noted   • History of osteomyelitis 05/05/2017   • Anxiety and depression 05/05/2017   • Prediabetes 05/05/2017   • Dyslipidemia 05/05/2017   • Nicotine dependence 03/03/2017   • PFO (patent foramen ovale) 07/06/2016   • Schizoaffective disorder (CMS-HCC) 06/16/2016   • Hypertension 02/16/2016   • Dilated cardiomyopathy (CMS-HCC) 07/30/2015   • Acute systolic heart failure (CMS-HCC) 07/25/2015   • Mitral regurgitation 07/25/2015   • GERD (gastroesophageal reflux disease) 11/01/2013   • ED (erectile dysfunction) 03/08/2012   • Chronic pain of right knee 03/08/2012   •  "Chronic back pain 01/05/2010   • Venous insufficiency        Current Outpatient Prescriptions   Medication Sig Dispense Refill   • risperidone (RISPERDAL) 2 MG Tab Take 2 mg by mouth 2 times a day.     • gabapentin (NEURONTIN) 300 MG Cap Take 1 Cap by mouth 3 times a day. 90 Cap 5   • ibuprofen (MOTRIN) 600 MG Tab TAKE ONE TABLET BY MOUTH EVERY 8 HOURS AS NEEDED FOR MODERATE PAIN. 90 Tab 3   • sertraline (ZOLOFT) 100 MG Tab Take 1 Tab by mouth every day. 60 Tab 4   • betamethasone dipropionate (DIPROLENE) 0.05 % Ointment Apply to affected area 2 times a day 3 Tube 4   • buPROPion SR (WELLBUTRIN-SR) 100 MG TABLET SR 12 HR Take 1 Tab by mouth every day. 60 Tab 3   • DS-Herqxiokhfr-Aizac-DM (RESPERAL PO) Take  by mouth.     • trazodone (DESYREL) 50 MG Tab Take 1 Tab by mouth every bedtime. 30 Tab 3   • sildenafil citrate (VIAGRA) 25 MG Tab Take 1 Tab by mouth as needed for Erectile Dysfunction. 10 Tab 0   • Specialty Vitamins Products (HEALTHY HEART COMPLEX) Tab Take  by mouth.       No current facility-administered medications for this visit.            Review of Systems:     Constitutional: Denies fevers, Denies weight changes  Eyes: Denies changes in vision, no eye pain  Ears/Nose/Throat/Mouth: Denies nasal congestion or sore throat   Cardiovascular: Denies chest pain or palpitations   Respiratory: Denies shortness of breath , Denies cough  Gastrointestinal/Hepatic: Denies abdominal pain, nausea, vomiting, diarrhea or constipation.  Genitourinary: Denies bladder dysfunction, dysuria or frequency  Musculoskeletal/Rheum: Chronic back pain. Chronic knee pain.  Neurological: Denies headache, confusion, memory loss or focal weakness/parasthesias  Psychiatric: denies mood disorder               /84   Pulse 80   Temp 36.2 °C (97.2 °F)   Resp 19   Ht 1.778 m (5' 10\")   Wt 102.2 kg (225 lb 3.2 oz)   SpO2 96%   BMI 32.31 kg/m²     Physical Exam   Constitutional:  Comfortable. No acute distress.   Eyes: Pupils are " equal, round, and reactive to light. No scleral icterus.  Neck: Neck supple. No thyromegaly present.   Cardiovascular: Normal rate, regular rhythm and normal heart sounds.  Exam reveals no gallop and no friction rub.  No murmur heard.  Pulmonary/Chest: Lungs clear to ascultation bilaterally. Breath sounds normal. No rhonchi, wheezes or crackles.   Musculoskeletal:   No deformity noted. no edema.   Lymphadenopathy: no cervical adenopathy  Neurological: alert and oriented to person, place, and time.  No obvious motor or sensory deficits.  Extremities: No edema. No clubbing. No cyanosis. Distal pulses 2+ bilaterally. Right knee exhibiting pain to flexion and extension movements. No swelling or redness noted. He has thick white skin plaque affecting his left palm and another small one affecting his right palm and some on the inner side of his thumps.  Skin: No Rash. No cyanosis. Nails show no clubbing.    Assessment and Plan    1. Prediabetes  -His last hemoglobin A1c was 5.8, repeat today is 5.9.  -Patient probably developing a metabolic syndrome from psychiatric medications.  -Patient over to metformin as evidences sure that it can delay the progression to diabetes but he declined and willing to try lifestyle changes including diet and exercise.  -Patient provided with a printout about a healthy diet.  -We'll check chemistry panel.  -We'll repeat hemoglobin A1c next visit.  - POCT  A1C  - COMP METABOLIC PANEL; Future    2. Hyperlipidemia, unspecified hyperlipidemia type  -Last lipid profile in December 2014 that showed total cholesterol 203, triglycerides 90, HDL 65, .  -Patient not on any lipid-lowering agent.  -We'll repeat lipid panel and decide whether we need to initiate a statin medication or not.  - LIPID PANEL    3. Chronic midline low back pain without sciatica  -Pain stable on current treatment.  -No warning signs like new weakness or numbness.  -We'll continue gabapentin.  - gabapentin (NEURONTIN)  300 MG Cap; Take 1 Cap by mouth 3 times a day.  Dispense: 90 Cap; Refill: 5    4. Chronic pain of right knee  -Due to osteoarthritis and meniscal tear.  -Patient following  with orthopedist.  -Plan is to do arthroscopy.  -Patient reported that he will contact the orthopedics clinic to arrange that.    5. Schizoaffective disorder, unspecified type  -Stable on Risperdal.  -We'll obtain CBC    6. BMI 32.0-32.9,adult  -Could be a metabolic syndrome due to antipsychotic medications.  -Patient counseled about healthy lifestyle including exercise and healthy diet.  -We'll check chemistry panel, CBC and lipid panel  - CBC WITH DIFFERENTIAL; Future    7. Tobacco abuse  -Patient down to 2 cigarettes per day.  -Currently on Wellbutrin.  -Not currently using any nicotine replacement.  -he is unwilling to quit at this point.  -Counseling provided.    8. Therapeutic drug monitoring  -Patient on antipsychotic medications.  -We'll check CBC  - CBC WITH DIFFERENTIAL; Future    9. Preventative health care  -Colonoscopy 2016 with removal of 7 polyps. Plan to perform colonoscopy every 3 years.  -Pneumococcal vaccine 2015.  -Flu shot last year  -Tdap 2016  -Patient directed to pharmacy for flu shot.  - CBC WITH DIFFERENTIAL; Future        Signed by: Espinoza Doshi M.D.

## 2017-10-12 ENCOUNTER — HOSPITAL ENCOUNTER (OUTPATIENT)
Dept: LAB | Facility: MEDICAL CENTER | Age: 67
End: 2017-10-12
Attending: INTERNAL MEDICINE
Payer: MEDICARE

## 2017-10-12 DIAGNOSIS — R73.03 PREDIABETES: ICD-10-CM

## 2017-10-12 DIAGNOSIS — Z00.00 PREVENTATIVE HEALTH CARE: ICD-10-CM

## 2017-10-12 DIAGNOSIS — Z51.81 THERAPEUTIC DRUG MONITORING: ICD-10-CM

## 2017-10-12 LAB
ALBUMIN SERPL BCP-MCNC: 3.8 G/DL (ref 3.2–4.9)
ALBUMIN/GLOB SERPL: 1.1 G/DL
ALP SERPL-CCNC: 57 U/L (ref 30–99)
ALT SERPL-CCNC: 15 U/L (ref 2–50)
ANION GAP SERPL CALC-SCNC: 10 MMOL/L (ref 0–11.9)
AST SERPL-CCNC: 21 U/L (ref 12–45)
BASOPHILS # BLD AUTO: 1.1 % (ref 0–1.8)
BASOPHILS # BLD: 0.11 K/UL (ref 0–0.12)
BILIRUB SERPL-MCNC: 0.5 MG/DL (ref 0.1–1.5)
BUN SERPL-MCNC: 19 MG/DL (ref 8–22)
CALCIUM SERPL-MCNC: 9.3 MG/DL (ref 8.5–10.5)
CHLORIDE SERPL-SCNC: 107 MMOL/L (ref 96–112)
CHOLEST SERPL-MCNC: 199 MG/DL (ref 100–199)
CO2 SERPL-SCNC: 23 MMOL/L (ref 20–33)
CREAT SERPL-MCNC: 0.95 MG/DL (ref 0.5–1.4)
EOSINOPHIL # BLD AUTO: 0.32 K/UL (ref 0–0.51)
EOSINOPHIL NFR BLD: 3.1 % (ref 0–6.9)
ERYTHROCYTE [DISTWIDTH] IN BLOOD BY AUTOMATED COUNT: 52.7 FL (ref 35.9–50)
GFR SERPL CREATININE-BSD FRML MDRD: >60 ML/MIN/1.73 M 2
GLOBULIN SER CALC-MCNC: 3.6 G/DL (ref 1.9–3.5)
GLUCOSE SERPL-MCNC: 90 MG/DL (ref 65–99)
HCT VFR BLD AUTO: 48.1 % (ref 42–52)
HDLC SERPL-MCNC: 54 MG/DL
HGB BLD-MCNC: 15.8 G/DL (ref 14–18)
IMM GRANULOCYTES # BLD AUTO: 0.03 K/UL (ref 0–0.11)
IMM GRANULOCYTES NFR BLD AUTO: 0.3 % (ref 0–0.9)
LDLC SERPL CALC-MCNC: 91 MG/DL
LYMPHOCYTES # BLD AUTO: 3 K/UL (ref 1–4.8)
LYMPHOCYTES NFR BLD: 28.8 % (ref 22–41)
MCH RBC QN AUTO: 31.8 PG (ref 27–33)
MCHC RBC AUTO-ENTMCNC: 32.8 G/DL (ref 33.7–35.3)
MCV RBC AUTO: 96.8 FL (ref 81.4–97.8)
MONOCYTES # BLD AUTO: 1.1 K/UL (ref 0–0.85)
MONOCYTES NFR BLD AUTO: 10.6 % (ref 0–13.4)
NEUTROPHILS # BLD AUTO: 5.84 K/UL (ref 1.82–7.42)
NEUTROPHILS NFR BLD: 56.1 % (ref 44–72)
NRBC # BLD AUTO: 0 K/UL
NRBC BLD AUTO-RTO: 0 /100 WBC
PLATELET # BLD AUTO: 308 K/UL (ref 164–446)
PMV BLD AUTO: 9.8 FL (ref 9–12.9)
POTASSIUM SERPL-SCNC: 4.1 MMOL/L (ref 3.6–5.5)
PROT SERPL-MCNC: 7.4 G/DL (ref 6–8.2)
RBC # BLD AUTO: 4.97 M/UL (ref 4.7–6.1)
SODIUM SERPL-SCNC: 140 MMOL/L (ref 135–145)
TRIGL SERPL-MCNC: 269 MG/DL (ref 0–149)
WBC # BLD AUTO: 10.4 K/UL (ref 4.8–10.8)

## 2017-10-12 PROCEDURE — 80061 LIPID PANEL: CPT | Mod: GA

## 2017-10-12 PROCEDURE — 80053 COMPREHEN METABOLIC PANEL: CPT

## 2017-10-12 PROCEDURE — 85025 COMPLETE CBC W/AUTO DIFF WBC: CPT

## 2017-10-12 PROCEDURE — 36415 COLL VENOUS BLD VENIPUNCTURE: CPT

## 2017-10-13 NOTE — PROGRESS NOTES
Labs showing normal CBC and CMP. Lipid profile showing total cholesterol 199,, HDL 54. His ASCVD score is 17%. Based on the score patient needs to be on moderate - high intensity statin. A letter generated with the results. Patient also needs an appointment to discuss results, treatment options and expected side effects.

## 2017-10-25 ENCOUNTER — OFFICE VISIT (OUTPATIENT)
Dept: INTERNAL MEDICINE | Facility: MEDICAL CENTER | Age: 67
End: 2017-10-25
Payer: MEDICARE

## 2017-10-25 VITALS
HEIGHT: 70 IN | DIASTOLIC BLOOD PRESSURE: 82 MMHG | HEART RATE: 74 BPM | WEIGHT: 229 LBS | RESPIRATION RATE: 18 BRPM | SYSTOLIC BLOOD PRESSURE: 124 MMHG | BODY MASS INDEX: 32.78 KG/M2 | OXYGEN SATURATION: 95 % | TEMPERATURE: 97.8 F

## 2017-10-25 DIAGNOSIS — F41.9 ANXIETY AND DEPRESSION: ICD-10-CM

## 2017-10-25 DIAGNOSIS — F20.9 SCHIZOPHRENIA, UNSPECIFIED TYPE (HCC): ICD-10-CM

## 2017-10-25 DIAGNOSIS — Z51.81 THERAPEUTIC DRUG MONITORING: ICD-10-CM

## 2017-10-25 DIAGNOSIS — E78.5 DYSLIPIDEMIA: ICD-10-CM

## 2017-10-25 DIAGNOSIS — F32.A ANXIETY AND DEPRESSION: ICD-10-CM

## 2017-10-25 PROCEDURE — 99214 OFFICE O/P EST MOD 30 MIN: CPT | Mod: GC | Performed by: INTERNAL MEDICINE

## 2017-10-25 RX ORDER — RISPERIDONE 2 MG/1
2 TABLET ORAL 2 TIMES DAILY
Qty: 60 TAB | Refills: 0 | Status: SHIPPED | OUTPATIENT
Start: 2017-10-25 | End: 2017-11-28

## 2017-10-25 RX ORDER — ATORVASTATIN CALCIUM 20 MG/1
TABLET, FILM COATED ORAL
Qty: 60 TAB | Refills: 3 | Status: SHIPPED | OUTPATIENT
Start: 2017-10-25 | End: 2017-11-28

## 2017-10-25 RX ORDER — HYDROXYZINE 50 MG/1
50 TABLET, FILM COATED ORAL 2 TIMES DAILY
Qty: 60 TAB | Refills: 1 | Status: SHIPPED | OUTPATIENT
Start: 2017-10-25 | End: 2017-11-28

## 2017-10-25 RX ORDER — SERTRALINE HYDROCHLORIDE 100 MG/1
100 TABLET, FILM COATED ORAL DAILY
Qty: 60 TAB | Refills: 4 | Status: SHIPPED | OUTPATIENT
Start: 2017-10-25 | End: 2017-11-28 | Stop reason: SDUPTHER

## 2017-10-25 RX ORDER — HYDROXYZINE 50 MG/1
50 TABLET, FILM COATED ORAL 3 TIMES DAILY PRN
COMMUNITY
End: 2017-10-25 | Stop reason: SDUPTHER

## 2017-10-25 RX ORDER — ATORVASTATIN CALCIUM 20 MG/1
TABLET, FILM COATED ORAL
Qty: 60 TAB | Refills: 3 | Status: SHIPPED | OUTPATIENT
Start: 2017-10-25 | End: 2017-10-25

## 2017-10-25 ASSESSMENT — PAIN SCALES - GENERAL: PAINLEVEL: 5=MODERATE PAIN

## 2017-10-25 ASSESSMENT — PATIENT HEALTH QUESTIONNAIRE - PHQ9: CLINICAL INTERPRETATION OF PHQ2 SCORE: 0

## 2017-10-25 NOTE — PROGRESS NOTES
Established Patient    Boyd presents today with the following:    CC: He to discuss labs including lipid profile and also refills for psychiatric medications    HPI: Patient is a 67-year-old male with past medical history of depression, anxiety, schizophrenia, osteoarthritis, tobacco abuse, chronic back pain, chronic knee pain and plaque psoriasis who presents today to discuss labs including lipid profile and to get refills of his psychiatric medications. Patient denied any complaints for today. His most recent labs including CBC and CMP were within normal limits. His lipid profile showed cholesterol of 199, triglycerides 269, HDL 54 and LDL 91. Labs explained and discussed with the patient. Patient also wants refill on some of his psychiatric medications as he switched providers recently and his appointment is scheduled for November 28.    Patient Active Problem List    Diagnosis Date Noted   • History of osteomyelitis 05/05/2017   • Anxiety and depression 05/05/2017   • Prediabetes 05/05/2017   • Dyslipidemia 05/05/2017   • Nicotine dependence 03/03/2017   • PFO (patent foramen ovale) 07/06/2016   • Schizoaffective disorder (CMS-HCC) 06/16/2016   • Hypertension 02/16/2016   • Dilated cardiomyopathy (CMS-HCC) 07/30/2015   • Acute systolic heart failure (CMS-HCC) 07/25/2015   • Mitral regurgitation 07/25/2015   • GERD (gastroesophageal reflux disease) 11/01/2013   • ED (erectile dysfunction) 03/08/2012   • Chronic pain of right knee 03/08/2012   • Chronic back pain 01/05/2010   • Venous insufficiency        Current Outpatient Prescriptions   Medication Sig Dispense Refill   • hydrOXYzine HCl (ATARAX) 50 MG Tab Take 1 Tab by mouth 2 Times a Day. 60 Tab 1   • sertraline (ZOLOFT) 100 MG Tab Take 1 Tab by mouth every day. 60 Tab 4   • risperidone (RISPERDAL) 2 MG Tab Take 1 Tab by mouth 2 times a day. 60 Tab 0   • atorvastatin (LIPITOR) 20 MG Tab 1 tab po qd 60 Tab 3   • gabapentin (NEURONTIN) 300 MG Cap Take 1 Cap  "by mouth 3 times a day. 90 Cap 5   • ibuprofen (MOTRIN) 600 MG Tab TAKE ONE TABLET BY MOUTH EVERY 8 HOURS AS NEEDED FOR MODERATE PAIN. 90 Tab 3   • betamethasone dipropionate (DIPROLENE) 0.05 % Ointment Apply to affected area 2 times a day 3 Tube 4   • Specialty Vitamins Products (HEALTHY HEART COMPLEX) Tab Take  by mouth.     • buPROPion SR (WELLBUTRIN-SR) 100 MG TABLET SR 12 HR Take 1 Tab by mouth every day. 60 Tab 3   • NR-Opaxxpvlzwu-Rnext-DM (RESPERAL PO) Take  by mouth.     • trazodone (DESYREL) 50 MG Tab Take 1 Tab by mouth every bedtime. 30 Tab 3   • sildenafil citrate (VIAGRA) 25 MG Tab Take 1 Tab by mouth as needed for Erectile Dysfunction. 10 Tab 0     No current facility-administered medications for this visit.            Review of Systems:     Constitutional: Denies fevers. Weight gain  Eyes: Denies changes in vision, no eye pain  Ears/Nose/Throat/Mouth: Denies nasal congestion or sore throat   Cardiovascular: Denies chest pain or palpitations   Respiratory: Denies shortness of breath , Denies cough  Gastrointestinal/Hepatic: Denies abdominal pain, nausea, vomiting, diarrhea or constipation.  Genitourinary: Denies bladder dysfunction, dysuria or frequency  Musculoskeletal/Rheum: Chronic knee pain  Skin: Psoriatic rash on palms  Neurological: Denies headache, confusion, memory loss or focal weakness/parasthesias  Psychiatric: denies mood disorder               /82   Pulse 74   Temp 36.6 °C (97.8 °F)   Resp 18   Ht 1.778 m (5' 10\")   Wt 103.9 kg (229 lb)   SpO2 95%   BMI 32.86 kg/m²     Physical Exam   Constitutional:  Comfortable. No acute distress.   Eyes: Pupils are equal, round, and reactive to light. No scleral icterus.  Neck: Neck supple. No thyromegaly present.   Cardiovascular: Normal rate, regular rhythm and normal heart sounds.  Exam reveals no gallop and no friction rub.  No murmur heard.  Pulmonary/Chest: Lungs clear to ascultation bilaterally. Breath sounds normal. No rhonchi, " wheezes or crackles.   Musculoskeletal:   No deformity noted. no edema.   Lymphadenopathy: no cervical adenopathy  Neurological: alert and oriented to person, place, and time.  No obvious motor or sensory deficits.  Extremities: No edema. No clubbing. No cyanosis. Distal pulses 2+ bilaterally.  He has thick white skin plaque affecting his left palm and another small one affecting his right palm and some on the inner side of his thumps. Improving compared to previous exam  Skin: . No cyanosis. Nails show no clubbing.      Assessment and Plan    1. Anxiety and depression  -Symptoms are stable on current treatment.  -Patient denied any suicidal or homicidal ideation.  -Following with psychiatry and has an upcoming appointment on November 28.  -We'll refill his Zoloft and hydroxyzine until he sees his psychiatrist  - hydrOXYzine HCl (ATARAX) 50 MG Tab; Take 1 Tab by mouth 2 Times a Day.  Dispense: 60 Tab; Refill: 1  - sertraline (ZOLOFT) 100 MG Tab; Take 1 Tab by mouth every day.  Dispense: 60 Tab; Refill: 4    2. Dyslipidemia  -His most recent lipid profile showed total cholesterol 199, triglyceride 269, HDL 54, LDL 91.  -His Grades of ASCVD score is 11.5%.  -We'll start patient on statin  -Counseled about expected side effects  -Baseline liver function test is normal. Repeat in 5 weeks  -We'll repeat lipid profile in 6 months after treatment  -Patient was also counseled about healthy lifestyle including diet and exercise.  - atorvastatin (LIPITOR) 20 MG Tab; 1 tab po qd  Dispense: 60 Tab; Refill: 3    3. Schizophrenia, unspecified type (CMS-HCC)  -Symptoms currently stable on Risperdal. We'll continue for now.  -Patient following with psychiatrist and has an upcoming appointment is on November 28.  - risperidone (RISPERDAL) 2 MG Tab; Take 1 Tab by mouth 2 times a day.  Dispense: 60 Tab; Refill: 0    4. Therapeutic drug monitoring  -We'll check liver function tests 5 weeks after initiation of statins.  - HEPATIC  FUNCTION PANEL; Future                Signed by: Espinoza Doshi M.D.

## 2017-11-22 DIAGNOSIS — F51.01 PRIMARY INSOMNIA: ICD-10-CM

## 2017-11-22 NOTE — TELEPHONE ENCOUNTER
Was the patient seen in the last year in this department? Yes  Pt last seen on: 10/25/2017 by Dr. Davalos Next appt on: 11/28/2017    Does patient have an active prescription for medications requested? No     Received Request Via: Pharmacy

## 2017-11-25 ENCOUNTER — HOSPITAL ENCOUNTER (OUTPATIENT)
Dept: LAB | Facility: MEDICAL CENTER | Age: 67
End: 2017-11-25
Attending: INTERNAL MEDICINE
Payer: MEDICARE

## 2017-11-25 DIAGNOSIS — Z51.81 THERAPEUTIC DRUG MONITORING: ICD-10-CM

## 2017-11-25 LAB
ALBUMIN SERPL BCP-MCNC: 4.3 G/DL (ref 3.2–4.9)
ALP SERPL-CCNC: 58 U/L (ref 30–99)
ALT SERPL-CCNC: 19 U/L (ref 2–50)
AST SERPL-CCNC: 19 U/L (ref 12–45)
BILIRUB CONJ SERPL-MCNC: 0.1 MG/DL (ref 0.1–0.5)
BILIRUB INDIRECT SERPL-MCNC: 0.5 MG/DL (ref 0–1)
BILIRUB SERPL-MCNC: 0.6 MG/DL (ref 0.1–1.5)
PROT SERPL-MCNC: 7.7 G/DL (ref 6–8.2)

## 2017-11-25 PROCEDURE — 36415 COLL VENOUS BLD VENIPUNCTURE: CPT

## 2017-11-25 PROCEDURE — 80076 HEPATIC FUNCTION PANEL: CPT

## 2017-11-27 RX ORDER — TRAZODONE HYDROCHLORIDE 50 MG/1
50 TABLET ORAL
Qty: 30 TAB | Refills: 3 | Status: SHIPPED | OUTPATIENT
Start: 2017-11-27 | End: 2018-03-11 | Stop reason: SDUPTHER

## 2017-11-28 ENCOUNTER — OFFICE VISIT (OUTPATIENT)
Dept: BEHAVIORAL HEALTH | Facility: PHYSICIAN GROUP | Age: 67
End: 2017-11-28
Payer: MEDICARE

## 2017-11-28 ENCOUNTER — OFFICE VISIT (OUTPATIENT)
Dept: INTERNAL MEDICINE | Facility: MEDICAL CENTER | Age: 67
End: 2017-11-28
Payer: MEDICARE

## 2017-11-28 VITALS
DIASTOLIC BLOOD PRESSURE: 86 MMHG | OXYGEN SATURATION: 95 % | HEART RATE: 84 BPM | RESPIRATION RATE: 18 BRPM | BODY MASS INDEX: 32.9 KG/M2 | WEIGHT: 229.8 LBS | HEIGHT: 70 IN | TEMPERATURE: 97.6 F | SYSTOLIC BLOOD PRESSURE: 134 MMHG

## 2017-11-28 DIAGNOSIS — F41.9 ANXIETY AND DEPRESSION: ICD-10-CM

## 2017-11-28 DIAGNOSIS — Z00.00 PREVENTATIVE HEALTH CARE: ICD-10-CM

## 2017-11-28 DIAGNOSIS — Z72.0 TOBACCO ABUSE: ICD-10-CM

## 2017-11-28 DIAGNOSIS — M54.50 CHRONIC MIDLINE LOW BACK PAIN WITHOUT SCIATICA: ICD-10-CM

## 2017-11-28 DIAGNOSIS — G89.29 CHRONIC PAIN OF RIGHT KNEE: ICD-10-CM

## 2017-11-28 DIAGNOSIS — R73.03 PREDIABETES: ICD-10-CM

## 2017-11-28 DIAGNOSIS — M25.561 CHRONIC PAIN OF RIGHT KNEE: ICD-10-CM

## 2017-11-28 DIAGNOSIS — F20.9 SCHIZOPHRENIA, UNSPECIFIED TYPE (HCC): ICD-10-CM

## 2017-11-28 DIAGNOSIS — G89.29 CHRONIC MIDLINE LOW BACK PAIN WITHOUT SCIATICA: ICD-10-CM

## 2017-11-28 DIAGNOSIS — F32.A ANXIETY AND DEPRESSION: ICD-10-CM

## 2017-11-28 DIAGNOSIS — E78.5 DYSLIPIDEMIA: ICD-10-CM

## 2017-11-28 PROCEDURE — 99214 OFFICE O/P EST MOD 30 MIN: CPT | Mod: GC | Performed by: INTERNAL MEDICINE

## 2017-11-28 PROCEDURE — 99204 OFFICE O/P NEW MOD 45 MIN: CPT | Performed by: STUDENT IN AN ORGANIZED HEALTH CARE EDUCATION/TRAINING PROGRAM

## 2017-11-28 RX ORDER — SERTRALINE HYDROCHLORIDE 100 MG/1
150 TABLET, FILM COATED ORAL EVERY MORNING
Qty: 45 TAB | Refills: 2 | Status: SHIPPED | OUTPATIENT
Start: 2017-11-28 | End: 2017-12-18 | Stop reason: SDUPTHER

## 2017-11-28 RX ORDER — ATORVASTATIN CALCIUM 20 MG/1
TABLET, FILM COATED ORAL
Qty: 60 TAB | Refills: 3 | Status: SHIPPED | OUTPATIENT
Start: 2017-11-28 | End: 2018-04-08 | Stop reason: SDUPTHER

## 2017-11-28 RX ORDER — PROPRANOLOL HYDROCHLORIDE 10 MG/1
10 TABLET ORAL 2 TIMES DAILY
Qty: 60 TAB | Refills: 2 | Status: SHIPPED | OUTPATIENT
Start: 2017-11-28 | End: 2017-12-18 | Stop reason: SDUPTHER

## 2017-11-28 RX ORDER — OLANZAPINE 10 MG/1
10 TABLET ORAL
Qty: 45 TAB | Refills: 2 | Status: SHIPPED | OUTPATIENT
Start: 2017-11-28 | End: 2017-12-18 | Stop reason: SDUPTHER

## 2017-11-28 ASSESSMENT — PATIENT HEALTH QUESTIONNAIRE - PHQ9: CLINICAL INTERPRETATION OF PHQ2 SCORE: 0

## 2017-11-28 ASSESSMENT — PAIN SCALES - GENERAL: PAINLEVEL: 7=MODERATE-SEVERE PAIN
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
## 2017-11-28 NOTE — PROGRESS NOTES
Established Patient    Boyd presents today with the following:    CC: Follow-up on chronic problems including back pain, plaque psoriasis, hyperlipidemia, anxiety, depression and prediabetes    HPI: Patient is a 67-year-old male with past medical history of depression, anxiety, schizophrenia, hyperlipidemia, osteoarthritis, chronic back pain, chronic right knee pain, plaque psoriasis, prediabetes and tobacco use presents today for follow-up visit on the above problems. Patient denied any complaints for today. Patient was last seen in our clinic about 5 weeks ago and by that time he was started on atorvastatin for hyperlipidemia. He is currently on 40 mg daily and tolerating the medicine without side effects. Repeated liver function test is within normal limits. His anxiety, depression and schizophrenia stable at this point. He denied any mood problem or suicidal or homicidal ideation. Still following with behavioral health and has an appointment scheduled for today. His back pain is under good control with the gabapentin. He is still complaining of psoriasis plaques but he is using his betamethasone cream once every other day. His knee pain is stable. Because of insurance issues he was unable to get to see the orthopedist again and he is going to check if they accept his new insurance, Medicare. Patient otherwise denied any complaint for today.    Patient Active Problem List    Diagnosis Date Noted   • Schizophrenia (CMS-HCC) 11/28/2017   • History of osteomyelitis 05/05/2017   • Anxiety and depression 05/05/2017   • Prediabetes 05/05/2017   • Dyslipidemia 05/05/2017   • Nicotine dependence 03/03/2017   • PFO (patent foramen ovale) 07/06/2016   • Dilated cardiomyopathy (CMS-HCC) 07/30/2015   • Mitral regurgitation 07/25/2015   • GERD (gastroesophageal reflux disease) 11/01/2013   • ED (erectile dysfunction) 03/08/2012   • Chronic pain of right knee 03/08/2012   • Chronic back pain 01/05/2010   • Venous  "insufficiency        Current Outpatient Prescriptions   Medication Sig Dispense Refill   • atorvastatin (LIPITOR) 20 MG Tab 2 tabs po QD 60 Tab 3   • gabapentin (NEURONTIN) 300 MG Cap Take 1 Cap by mouth 3 times a day. 90 Cap 5   • ibuprofen (MOTRIN) 600 MG Tab TAKE ONE TABLET BY MOUTH EVERY 8 HOURS AS NEEDED FOR MODERATE PAIN. 90 Tab 3   • Specialty Vitamins Products (HEALTHY HEART COMPLEX) Tab Take  by mouth.     • buPROPion SR (WELLBUTRIN-SR) 100 MG TABLET SR 12 HR Take 1 Tab by mouth every day. 60 Tab 3   • trazodone (DESYREL) 50 MG Tab Take 1 Tab by mouth every bedtime. 30 Tab 3   • hydrOXYzine HCl (ATARAX) 50 MG Tab Take 1 Tab by mouth 2 Times a Day. 60 Tab 1   • sertraline (ZOLOFT) 100 MG Tab Take 1 Tab by mouth every day. 60 Tab 4   • risperidone (RISPERDAL) 2 MG Tab Take 1 Tab by mouth 2 times a day. 60 Tab 0   • QT-Tknsvxahvtl-Uzhmc-DM (RESPERAL PO) Take  by mouth.     • betamethasone dipropionate (DIPROLENE) 0.05 % Ointment Apply to affected area 2 times a day 3 Tube 4   • sildenafil citrate (VIAGRA) 25 MG Tab Take 1 Tab by mouth as needed for Erectile Dysfunction. 10 Tab 0     No current facility-administered medications for this visit.            Review of Systems:     Constitutional: Denies fevers, Denies weight changes  Eyes: Denies changes in vision, no eye pain  Ears/Nose/Throat/Mouth: Denies nasal congestion or sore throat   Cardiovascular: Denies chest pain or palpitations   Respiratory: Denies shortness of breath , Denies cough  Gastrointestinal/Hepatic: Denies abdominal pain, nausea, vomiting, diarrhea or constipation.  Genitourinary: Denies bladder dysfunction, dysuria or frequency  Musculoskeletal/Rheum: Chronic back and knee pain  Skin: Palmar rash, psoriasis plaques  Neurological: Denies headache, confusion, memory loss or focal weakness/parasthesias  Psychiatric: denies mood disorder               /86   Pulse 84   Temp 36.4 °C (97.6 °F)   Resp 18   Ht 1.778 m (5' 10\")   Wt 104.2 " kg (229 lb 12.8 oz)   SpO2 95%   BMI 32.97 kg/m²     Physical Exam   Constitutional:  Comfortable. No acute distress.   Eyes: Pupils are equal, round, and reactive to light. No scleral icterus.  Neck: Neck supple. No thyromegaly present.   Cardiovascular: Normal rate, regular rhythm and normal heart sounds.  Exam reveals no gallop and no friction rub.  No murmur heard.  Pulmonary/Chest: Lungs clear to ascultation bilaterally. Breath sounds normal. No rhonchi, wheezes or crackles.   Musculoskeletal:   No deformity noted. no edema.   Lymphadenopathy: no cervical adenopathy  Neurological: alert and oriented to person, place, and time. No obvious motor or sensory deficits.  Extremities: No edema. No clubbing. No cyanosis. Distal pulses 2+ bilaterally. Psoriasis plaques on palms of the hands more on the left side. Full range of motion of knee joint bilaterally  Skin: No Rash. No cyanosis. Nails show no clubbing.      Assessment and Plan    1. Anxiety and depression  -Patient denied any suicidal or homicidal ideation.  -He is currently on Prozac. Patient following with psychiatry and has an appointment scheduled for today.  -We'll continue current treatment.    2. Chronic midline low back pain without sciatica  -Stable on gabapentin.  -No red flags like weakness, numbness, tingling, bladder or bowel problems.  -We'll continue current treatment.    3. Chronic pain of right knee  -Severe osteoarthritis.  -Was supposed to have some orthopedic surgery but could not get that due to insurance issues.  -He will try to contact orthopedics again to see if they accept his new insurance: Medicare.    4. Dyslipidemia  -His last lipid profile showed total cholesterol 199, triglycerides 269, HDL 54, LDL 91.  -His calculated ASCVD score is 11.5.  -Patient started on atorvastatin 40 and he is tolerating the medicine without problems. Repeated liver function tests show no abnormalities.  -We will repeat lipid profile in 6 months after  starting treatment.  - atorvastatin (LIPITOR) 20 MG Tab; 2 tabs po QD  Dispense: 60 Tab; Refill: 3    5. Prediabetes  -His last A1c was 5.8.  -Patient advised about lifestyle changes including diet and exercise.  -We'll recheck A1c in 3-6 months.    6. Schizophrenia, unspecified type (CMS-HCC)  -Symptoms stable on current treatment including Risperdal.  -Patient with psychiatry.    7. Tobacco abuse  -Patient still smoking 2 cigars per day.  -Not to quit at this point.  -Counseling provided.    8. Preventative health care  -Flu shot this year  -Pneumovax 2015.  -Tdap 2016.  -Colonoscopy was done 2016-07 polyps and recommended repeat in 3 years.                Signed by: Espinoza Doshi M.D.

## 2017-12-18 ENCOUNTER — OFFICE VISIT (OUTPATIENT)
Dept: BEHAVIORAL HEALTH | Facility: PHYSICIAN GROUP | Age: 67
End: 2017-12-18
Payer: MEDICARE

## 2017-12-18 DIAGNOSIS — F51.01 PRIMARY INSOMNIA: ICD-10-CM

## 2017-12-18 DIAGNOSIS — M54.50 CHRONIC MIDLINE LOW BACK PAIN WITHOUT SCIATICA: ICD-10-CM

## 2017-12-18 DIAGNOSIS — F20.9 SCHIZOPHRENIA, UNSPECIFIED TYPE (HCC): ICD-10-CM

## 2017-12-18 DIAGNOSIS — G89.29 CHRONIC MIDLINE LOW BACK PAIN WITHOUT SCIATICA: ICD-10-CM

## 2017-12-18 PROCEDURE — 99214 OFFICE O/P EST MOD 30 MIN: CPT | Performed by: STUDENT IN AN ORGANIZED HEALTH CARE EDUCATION/TRAINING PROGRAM

## 2017-12-18 RX ORDER — GABAPENTIN 300 MG/1
300 CAPSULE ORAL 3 TIMES DAILY
Qty: 90 CAP | Refills: 5 | Status: SHIPPED | OUTPATIENT
Start: 2017-12-18 | End: 2018-01-19 | Stop reason: SDUPTHER

## 2017-12-18 RX ORDER — PROPRANOLOL HYDROCHLORIDE 10 MG/1
10 TABLET ORAL 2 TIMES DAILY
Qty: 60 TAB | Refills: 5 | Status: SHIPPED | OUTPATIENT
Start: 2017-12-18 | End: 2018-03-26 | Stop reason: SDUPTHER

## 2017-12-18 RX ORDER — OLANZAPINE 10 MG/1
10 TABLET ORAL
Qty: 30 TAB | Refills: 2 | Status: SHIPPED | OUTPATIENT
Start: 2017-12-18 | End: 2018-01-19 | Stop reason: SDUPTHER

## 2017-12-18 RX ORDER — SERTRALINE HYDROCHLORIDE 100 MG/1
150 TABLET, FILM COATED ORAL EVERY MORNING
Qty: 45 TAB | Refills: 2 | Status: SHIPPED | OUTPATIENT
Start: 2017-12-18 | End: 2018-01-19 | Stop reason: SDUPTHER

## 2017-12-18 NOTE — PROGRESS NOTES
[facility]  PSYCHIATRIC FOLLOW-UP NOTE    Name: Boyd Askew  MRN: [id number]  : 1950  Age: 67 y.o.  Date of assessment: 2017  PCP: Espinoza Doshi M.D.  Persons in attendance: Patient  Total face-to-face time: 30 minutes    REASON FOR VISIT/CHIEF COMPLAINT (as stated by Patient):  Boyd Askew is a67 y/o white male with mental health hx of schizophrenia, recently approved for disability in May 2017, seen for follow-up today, previously seen by this writer on 17.     CURRENT PSYCHOTROPIC MEDS:    -sertraline (zoloft) 150mg PO QDaily  Olanzapine (zyprexa) 10mg PO QHS   -Trazodone 50mg PO QHS PRN  -propranolol 10mg BID   -gabapentin 300mg PO qdaily for back pain       HISTORY OF PRESENT ILLNESS:    Says that since his last visit he has been doing really well. Likes olanzepine over risperidal and says his mood is good. Says propranolol makes him tired at times but overall feels more alert and less anxious. Denies any other problems or concerns with his medications. Sleep and appetite are normal. Says he is going [employer] shopping for his [employer] but otherwise spending holidays with his family. Will continue same medication regiment and f/u in 8 weeks-refills completed today.    PSYCHOSOCIAL CHANGES SINCE PREVIOUS CONTACT:  None reported    RESPONSE TO TREATMENT:  Mood and anxiety is well managed with current regiment    MEDICATION SIDE EFFECTS:  Tired with propranolol but says he likes his regiment combination at this time.    REVIEW OF SYSTEMS:        Constitutional negative   Eyes negative   Ears/Nose/Mouth/Throat negative   Cardiovascular negative   Respiratory negative   Gastrointestinal negative   Genitourinary negative   Muscular negative   Integumentary negative   Neurological negative   Endocrine negative   Hematologic/Lymphatic negative       PSYCHIATRIC EXAMINATION/MENTAL STATUS  There were no vitals taken for this visit.  Participation: Active  verbal participation  Grooming:Good  Orientation: Alert  Eye contact: Good  Behavior:Calm   Mood: Euthymic  Affect: Flexible  Thought process: Logical  Thought content:  Within normal limits  Speech: Rate within normal limits  Perception:  Within normal limits  Memory:  No gross evidence of memory deficits  Insight: Good  Judgment: Good    Current risk:    Suicide: Low   Homicide: Low   Self-harm: Low  Relapse: Low  Other:   Crisis Safety Plan reviewed?Yes    Medical Records/Labs/Diagnostic Tests Reviewed: Labs:10/12/17:  CBC/CMP  .  11/25/17: LIPIDS WNL        ASSESSMENT/ PLAN:  66 y/o white male with hx of schizophrenia with catatonic features seen for new establishment visit. Although appear to be stable with appropriate thought process/content, has some concerns about increased anxiety and how Risperidal makes him feel not like himself, switched to olanzapine and doing much better overall.    #Schizophrenia  -r/o MDD with catatonic features     -cont. Olanzepine 10mg PO QHS  -cont. fluoxetine to 150mg PO QDaily - previous notes suggest patient has done really well with 250mg daily for his mood/anxiety. Consider titration in future if depression worsens.   -Cont. Trazodone 50mg PO QHS    -cont.propranolol 10mg PO BID for anxiety .  -Cont. Gabapentin 300mg PO QDaily for back pain   -f/u 8 weeks    Rom Linares M.D.

## 2017-12-19 ENCOUNTER — DOCUMENTATION (OUTPATIENT)
Dept: BEHAVIORAL HEALTH | Facility: MEDICAL CENTER | Age: 67
End: 2017-12-19

## 2018-01-19 ENCOUNTER — OFFICE VISIT (OUTPATIENT)
Dept: BEHAVIORAL HEALTH | Facility: PHYSICIAN GROUP | Age: 68
End: 2018-01-19
Payer: MEDICARE

## 2018-01-19 DIAGNOSIS — Z86.59 HX OF SCHIZOPHRENIA: ICD-10-CM

## 2018-01-19 PROCEDURE — 99214 OFFICE O/P EST MOD 30 MIN: CPT | Performed by: STUDENT IN AN ORGANIZED HEALTH CARE EDUCATION/TRAINING PROGRAM

## 2018-01-19 RX ORDER — GABAPENTIN 300 MG/1
300 CAPSULE ORAL 3 TIMES DAILY
Qty: 90 CAP | Refills: 5 | Status: SHIPPED | OUTPATIENT
Start: 2018-01-19 | End: 2018-10-23

## 2018-01-19 RX ORDER — SERTRALINE HYDROCHLORIDE 100 MG/1
150 TABLET, FILM COATED ORAL EVERY MORNING
Qty: 45 TAB | Refills: 11 | Status: SHIPPED | OUTPATIENT
Start: 2018-01-19 | End: 2018-10-23

## 2018-01-19 RX ORDER — OLANZAPINE 10 MG/1
10 TABLET ORAL
Qty: 30 TAB | Refills: 11 | Status: SHIPPED | OUTPATIENT
Start: 2018-01-19 | End: 2019-01-28 | Stop reason: SDUPTHER

## 2018-01-19 NOTE — PROGRESS NOTES
[facility]  PSYCHIATRIC FOLLOW-UP NOTE    Name: Boyd Askew  MRN: [id number]  : 1950  Age: 67 y.o.  Date of assessment: 18  PCP: Espinoza Doshi M.D.  Persons in attendance: Patient  Total face-to-face time: 30 minutes    REASON FOR VISIT/CHIEF COMPLAINT (as stated by Patient):  Boyd Askew is a67 y/o white male with mental health hx of schizophrenia, recently approved for disability in May 2017, seen for follow-up today, previously seen by this writer on 17     CURRENT PSYCHOTROPIC MEDS:    -sertraline (zoloft) 150mg PO QDaily  Olanzapine (zyprexa) 10mg PO QHS   -Trazodone 50mg PO QHS PRN- 2x/week  -propranolol 10mg BID   -gabapentin 300mg PO qdaily for back pain       HISTORY OF PRESENT ILLNESS:    Says that since his last visit he has been doing really well. Says his medications are keeping him stable, however, admits he sees shadows and feels paranoid once in a while that people are following him, but otherwise says this 'comes and goes' and he is able to manage his symptoms. Wants to remain on current regiment at this time without changes. Sleeping and eating without difficulty. Will refill meds and f/u in 1 month.     PSYCHOSOCIAL CHANGES SINCE PREVIOUS CONTACT:  None reported    RESPONSE TO TREATMENT:  Mood and anxiety is well managed with current regiment    MEDICATION SIDE EFFECTS:  Tired with propranolol but says he likes his regiment combination at this time.    REVIEW OF SYSTEMS:        Constitutional negative   Eyes negative   Ears/Nose/Mouth/Throat negative   Cardiovascular negative   Respiratory negative   Gastrointestinal negative   Genitourinary negative   Muscular negative   Integumentary negative   Neurological negative   Endocrine negative   Hematologic/Lymphatic negative       PSYCHIATRIC EXAMINATION/MENTAL STATUS  There were no vitals taken for this visit.  Participation: Active verbal participation  Grooming:Good  Orientation:  Alert  Eye contact: Good  Behavior:Calm   Mood: Euthymic  Affect: Flexible  Thought process: Logical  Thought content:  Within normal limits  Speech: Rate within normal limits  Perception:  Within normal limits  Memory:  No gross evidence of memory deficits  Insight: Good  Judgment: Good    Current risk:    Suicide: Low   Homicide: Low   Self-harm: Low  Relapse: Low  Other:   Crisis Safety Plan reviewed?Yes    Medical Records/Labs/Diagnostic Tests Reviewed: Labs:10/12/17:  CBC/CMP  .  11/25/17: LIPIDS WNL        ASSESSMENT/ PLAN:  68 y/o white male with hx of schizophrenia with catatonic features, current regiment of olanzepine and fluoxetine working well. Although appear to be stable with appropriate thought process/content,initially has some concerns about increased anxiety and how Risperidal makes him feel not like himself, therefore switched to olanzapine and now doing much better overall.      #Hx of Schizophrenia- does not present negative symptoms.   -r/o MDD with catatonic features     -cont. Olanzepine 10mg PO QHS  -cont. fluoxetine to 150mg PO QDaily - previous notes suggest patient has done really well with 250mg daily for his mood/anxiety. Consider titration in future if depression worsens.   -Cont. Trazodone 50mg PO QHS    -cont.propranolol 10mg PO BID for anxiety .  -Cont. Gabapentin 300mg PO QDaily for back pain   -f/u 4 weeks    Rom Linares M.D.

## 2018-02-06 ENCOUNTER — OFFICE VISIT (OUTPATIENT)
Dept: INTERNAL MEDICINE | Facility: MEDICAL CENTER | Age: 68
End: 2018-02-06
Payer: MEDICARE

## 2018-02-06 VITALS
OXYGEN SATURATION: 96 % | TEMPERATURE: 97.7 F | HEIGHT: 70 IN | WEIGHT: 230 LBS | SYSTOLIC BLOOD PRESSURE: 136 MMHG | BODY MASS INDEX: 32.93 KG/M2 | DIASTOLIC BLOOD PRESSURE: 88 MMHG | HEART RATE: 64 BPM

## 2018-02-06 DIAGNOSIS — F41.9 ANXIETY AND DEPRESSION: ICD-10-CM

## 2018-02-06 DIAGNOSIS — F32.A ANXIETY AND DEPRESSION: ICD-10-CM

## 2018-02-06 DIAGNOSIS — F20.9 SCHIZOPHRENIA, UNSPECIFIED TYPE (HCC): ICD-10-CM

## 2018-02-06 DIAGNOSIS — R73.03 PREDIABETES: ICD-10-CM

## 2018-02-06 DIAGNOSIS — M54.50 CHRONIC MIDLINE LOW BACK PAIN WITHOUT SCIATICA: ICD-10-CM

## 2018-02-06 DIAGNOSIS — G89.29 CHRONIC MIDLINE LOW BACK PAIN WITHOUT SCIATICA: ICD-10-CM

## 2018-02-06 DIAGNOSIS — E78.5 DYSLIPIDEMIA: ICD-10-CM

## 2018-02-06 DIAGNOSIS — G89.29 CHRONIC PAIN OF RIGHT KNEE: ICD-10-CM

## 2018-02-06 DIAGNOSIS — M25.561 CHRONIC PAIN OF RIGHT KNEE: ICD-10-CM

## 2018-02-06 DIAGNOSIS — Z72.0 TOBACCO ABUSE: ICD-10-CM

## 2018-02-06 PROCEDURE — 99214 OFFICE O/P EST MOD 30 MIN: CPT | Mod: GC | Performed by: INTERNAL MEDICINE

## 2018-02-06 ASSESSMENT — PAIN SCALES - GENERAL: PAINLEVEL: 4=SLIGHT-MODERATE PAIN

## 2018-02-06 NOTE — PATIENT INSTRUCTIONS
Obesity  Obesity is defined as having too much total body fat and a body mass index (BMI) of 30 or more. BMI is an estimate of body fat and is calculated from your height and weight. BMI is typically calculated by your health care provider during regular wellness visits. Obesity happens when you consume more calories than you can burn by exercising or performing daily physical tasks. Prolonged obesity can cause major illnesses or emergencies, such as:  · Stroke.  · Heart disease.  · Diabetes.  · Cancer.  · Arthritis.  · High blood pressure (hypertension).  · High cholesterol.  · Sleep apnea.  · Erectile dysfunction.  · Infertility problems.  CAUSES   · Regularly eating unhealthy foods.  · Physical inactivity.  · Certain disorders, such as an underactive thyroid (hypothyroidism), Cushing's syndrome, and polycystic ovarian syndrome.  · Certain medicines, such as steroids, some depression medicines, and antipsychotics.  · Genetics.  · Lack of sleep.  DIAGNOSIS  A health care provider can diagnose obesity after calculating your BMI. Obesity will be diagnosed if your BMI is 30 or higher.  There are other methods of measuring obesity levels. Some other methods include measuring your skinfold thickness, your waist circumference, and comparing your hip circumference to your waist circumference.  TREATMENT   A healthy treatment program includes some or all of the following:  · Long-term dietary changes.  · Exercise and physical activity.  · Behavioral and lifestyle changes.  · Medicine only under the supervision of your health care provider. Medicines may help, but only if they are used with diet and exercise programs.  If your BMI is 40 or higher, your health care provider may recommend specialized surgery or programs to help with weight loss.  An unhealthy treatment program includes:  · Fasting.  · Fad diets.  · Supplements and drugs.  These choices do not succeed in long-term weight control.  HOME CARE  INSTRUCTIONS  · Exercise and perform physical activity as directed by your health care provider. To increase physical activity, try the following:  ¨ Use stairs instead of elevators.  ¨ Park farther away from store entrances.  ¨ Garden, bike, or walk instead of watching television or using the computer.  · Eat healthy, low-calorie foods and drinks on a regular basis. Eat more fruits and vegetables. Use low-calorie cookbooks or take healthy cooking classes.  · Limit fast food, sweets, and processed snack foods.  · Eat smaller portions.  · Keep a daily journal of everything you eat. There are many free websites to help you with this. It may be helpful to measure your foods so you can determine if you are eating the correct portion sizes.  · Avoid drinking alcohol. Drink more water and drinks without calories.  · Take vitamins and supplements only as recommended by your health care provider.  · Weight-loss support groups, registered dietitians, counselors, and stress reduction education can also be very helpful.  SEEK IMMEDIATE MEDICAL CARE IF:  · You have chest pain or tightness.  · You have trouble breathing or feel short of breath.  · You have weakness or leg numbness.  · You feel confused or have trouble talking.  · You have sudden changes in your vision.     This information is not intended to replace advice given to you by your health care provider. Make sure you discuss any questions you have with your health care provider.     Document Released: 01/25/2006 Document Revised: 01/08/2016 Document Reviewed: 01/23/2013  [employer] Interactive Patient Education ©2016 [employer]

## 2018-02-06 NOTE — PROGRESS NOTES
Established Patient    Boyd presents today with the following:    CC: Follow-up on chronic problems including depression, anxiety, chronic pain and psoriasis    HPI: Patient is a 67-year-old male with past medical history of depression, anxiety,, hyperlipidemia, osteoarthritis, chronic back pain, chronic right knee pain, plaque psoriasis, prediabetes and tobacco abuse who presents today for follow-up visit on the above problems. Patient denied any complaint for today. Regarding his psychiatric problems he had a recent appointment with his psychiatrist and propranolol was added to his medications. Also his risperidone was switched to olanzapine. Patient denied any active symptoms. He denied any suicidal or homicidal ideation. His back pain is very well controlled with gabapentin. As far as his knee pain, he is currently on ibuprofen as needed and is trying to arrange a follow-up appointment with orthopedics. He is still actively smoking about 5 cigarettes per week. Not willing to quit at this point. He is still gaining some weight and interested in a referral to weight management program. He is compliant with his medications and no issues reported.    Patient Active Problem List    Diagnosis Date Noted   • Hx of schizophrenia 01/19/2018   • Schizophrenia (CMS-HCC) 11/28/2017   • History of osteomyelitis 05/05/2017   • Anxiety and depression 05/05/2017   • Prediabetes 05/05/2017   • Dyslipidemia 05/05/2017   • Nicotine dependence 03/03/2017   • PFO (patent foramen ovale) 07/06/2016   • Dilated cardiomyopathy (CMS-HCC) 07/30/2015   • Mitral regurgitation 07/25/2015   • GERD (gastroesophageal reflux disease) 11/01/2013   • ED (erectile dysfunction) 03/08/2012   • Chronic pain of right knee 03/08/2012   • Chronic back pain 01/05/2010   • Venous insufficiency        Current Outpatient Prescriptions   Medication Sig Dispense Refill   • sertraline (ZOLOFT) 100 MG Tab Take 1.5 Tabs by mouth every morning. 45 Tab 11   •  "olanzapine (ZYPREXA) 10 MG tablet Take 1 Tab by mouth every bedtime. 30 Tab 11   • gabapentin (NEURONTIN) 300 MG Cap Take 1 Cap by mouth 3 times a day. 90 Cap 5   • ibuprofen (MOTRIN) 600 MG Tab TAKE ONE TABLET BY MOUTH EVERY 8 HOURS AS NEEDED FOR MODERATE PAIN. 90 Tab 3   • propranolol (INDERAL) 10 MG Tab Take 1 Tab by mouth 2 times a day. 60 Tab 5   • atorvastatin (LIPITOR) 20 MG Tab 2 tabs po QD 60 Tab 3   • trazodone (DESYREL) 50 MG Tab Take 1 Tab by mouth every bedtime. 30 Tab 3   • betamethasone dipropionate (DIPROLENE) 0.05 % Ointment Apply to affected area 2 times a day 3 Tube 4   • Specialty Vitamins Products (HEALTHY HEART COMPLEX) Tab Take  by mouth.     • PD-Wpfqrtyxcll-Lrutf-DM (RESPERAL PO) Take  by mouth.       No current facility-administered medications for this visit.            Review of Systems:     Constitutional: Denies fevers. Still gaining weight  Eyes: Denies changes in vision, no eye pain  Ears/Nose/Throat/Mouth: Denies nasal congestion or sore throat   Cardiovascular: Denies chest pain or palpitations   Respiratory: Denies shortness of breath , Denies cough  Gastrointestinal/Hepatic: Denies abdominal pain, nausea, vomiting, diarrhea or constipation.  Genitourinary: Denies bladder dysfunction, dysuria or frequency  Musculoskeletal/Rheum: Chronic back and right knee pain  Skin: Denies rash.  Neurological: Denies headache, confusion, memory loss or focal weakness/parasthesias  Psychiatric: denies mood disorder               /88   Pulse 64   Temp 36.5 °C (97.7 °F)   Ht 1.778 m (5' 10\")   Wt 104.3 kg (230 lb)   SpO2 96%   BMI 33.00 kg/m²     Physical Exam   Constitutional:  Comfortable. No acute distress. Obese  Eyes: Pupils are equal, round, and reactive to light. No scleral icterus.  Neck: Neck supple. No thyromegaly present.   Cardiovascular: Normal rate, regular rhythm and normal heart sounds.  Exam reveals no gallop and no friction rub.  No murmur heard.  Pulmonary/Chest: Lungs " clear to ascultation bilaterally. Breath sounds normal. No rhonchi, wheezes or crackles.   Abdomen: Flat distended. Soft, nontender  Musculoskeletal:   No deformity noted. no edema.   Lymphadenopathy: no cervical adenopathy  Neurological: alert and oriented to person, place, and time.  No obvious motor or sensory deficits.  Extremities: No edema. No clubbing. No cyanosis. Distal pulses 2+ bilaterally.  Skin: Psoriasis rash on palms of the hands bilaterally. No cyanosis. Nails show no clubbing.      Assessment and Plan    1. Anxiety and depression  -Patient denied any suicidal or homicidal ideation.  -he is currently on Zoloft and propranolol.  -We'll continue current treatment.  -Patient following with psychiatry on regular basis.    2. Chronic midline low back pain without sciatica  -Due to degenerative disease.  -Patient currently on gabapentin with good control of his pain. We'll continue    3. Chronic pain of right knee  -Due to osteoarthritis.  -Patient established with orthopedics and trying to arrange a follow-up appointment.  -Currently on as needed ibuprofen. We'll continue for now.    4. Dyslipidemia  -His last lipid profile showed total cholesterol 199, triglycerides 269, HDL 54, LDL 91.  -His calculated ASCVD score is 11.5.  -Patient started on atorvastatin 40 and he is tolerating the medicine without problems. Repeated liver function tests show no abnormalities.  -We will repeat lipid profile in 6 months after starting treatment.  -Will consider aspirin as primary prevention. We'll discuss on next visit    5. Schizophrenia, unspecified type (CMS-HCC)  -Stable.  -Patient with psychiatry.  -Currently on olanzapine.    6. Prediabetes  -Last A1c 5.8.  -Concern of metabolic syndrome with olanzapine.  -We will refer to weight management program.-We'll repeat A1c next visit.    7. BMI 33.0-33.9,adult  -BMI 33.  -Patient On Olanzapine Which Could Cause Metabolic Syndrome.  -We'll Refer to Weight Management  Program.  -Also advised healthy lifestyle including diet and exercise.  - REFERRAL TO [facility] HEALTH IMPROVEMENT PROGRAMS (HIP) Services Requested: Registered Dietitian for Medical Nutrition Therapy, Registered Dietitian Medicare Obesity Counseling; Reason for Visit: Change in Treatment Regimen    8. Tobacco abuse  -Patient smoking 5 cigarettes a week.  -Not willing to quit at this point.  -Counseling provided.        Preventive care  -Flu shot this year  -Pneumovax 2015.  -Tdap 2016.  -Colonoscopy was done 2016-07 polyps and recommended repeat in 3 years.    Signed by: Espinoza Doshi M.D.

## 2018-02-26 ENCOUNTER — APPOINTMENT (OUTPATIENT)
Dept: BEHAVIORAL HEALTH | Facility: PHYSICIAN GROUP | Age: 68
End: 2018-02-26
Payer: MEDICARE

## 2018-03-26 ENCOUNTER — OFFICE VISIT (OUTPATIENT)
Dept: BEHAVIORAL HEALTH | Facility: PHYSICIAN GROUP | Age: 68
End: 2018-03-26
Payer: MEDICARE

## 2018-03-26 DIAGNOSIS — F32.A ANXIETY AND DEPRESSION: ICD-10-CM

## 2018-03-26 DIAGNOSIS — F41.9 ANXIETY AND DEPRESSION: ICD-10-CM

## 2018-03-26 DIAGNOSIS — F20.2 CATATONIC SCHIZOPHRENIA (HCC): ICD-10-CM

## 2018-03-26 PROCEDURE — 99214 OFFICE O/P EST MOD 30 MIN: CPT | Performed by: STUDENT IN AN ORGANIZED HEALTH CARE EDUCATION/TRAINING PROGRAM

## 2018-03-26 RX ORDER — PROPRANOLOL HYDROCHLORIDE 10 MG/1
10 TABLET ORAL 2 TIMES DAILY
Qty: 60 TAB | Refills: 5 | Status: SHIPPED | OUTPATIENT
Start: 2018-03-26 | End: 2019-02-13 | Stop reason: SDUPTHER

## 2018-03-26 NOTE — PROGRESS NOTES
[facility]  PSYCHIATRIC FOLLOW-UP NOTE    Name: Boyd Askew  MRN: [id number]  : 1950  Age: 67 y.o.  Date of assessment: 18  PCP: Espinoza Doshi M.D.  Persons in attendance: Patient  Total face-to-face time: 30 minutes    REASON FOR VISIT/CHIEF COMPLAINT (as stated by Patient):  Boyd Askew is a67 y/o white male with mental health hx of schizophrenia, recently approved for disability in May 2017, seen for follow-up today, previously seen by this writer on 18     CURRENT PSYCHOTROPIC MEDS:    -sertraline (zoloft) 150mg PO QDaily  Olanzapine (zyprexa) 10mg PO QHS   -Trazodone 50mg PO QHS PRN- 2x/week  -propranolol 10mg BID   -gabapentin 300mg PO qdaily for back pain       HISTORY OF PRESENT ILLNESS:    Since his last appointment he states that his mood has been good, sleeping and eating without difficulty. Says he is trying to exercise daily with walks and tries to incorporate some exercise while cooking. Says that he has not had any problems with his medications- has noticed some weight gain over the last 7 months and therefore taking steps to control this (gain of 15 pounds). Otherwise will refill meds today and f/u mid May 2018.      PSYCHOSOCIAL CHANGES SINCE PREVIOUS CONTACT:  None reported    RESPONSE TO TREATMENT:  Mood and anxiety is well managed with current regiment    MEDICATION SIDE EFFECTS:  Weight gain-olanzepine    REVIEW OF SYSTEMS:        Constitutional negative   Eyes negative   Ears/Nose/Mouth/Throat negative   Cardiovascular negative   Respiratory negative   Gastrointestinal negative   Genitourinary negative   Muscular negative   Integumentary negative   Neurological negative   Endocrine negative   Hematologic/Lymphatic negative       PSYCHIATRIC EXAMINATION/MENTAL STATUS  There were no vitals taken for this visit.  Participation: Active verbal participation  Grooming:Good  Orientation: Alert  Eye contact: Good  Behavior:Calm   Mood:  Euthymic  Affect: Flexible  Thought process: Logical  Thought content:  Within normal limits  Speech: Rate within normal limits  Perception:  Within normal limits  Memory:  No gross evidence of memory deficits  Insight: Good  Judgment: Good    Current risk:    Suicide: Low   Homicide: Low   Self-harm: Low  Relapse: Low  Other:   Crisis Safety Plan reviewed?Yes    Medical Records/Labs/Diagnostic Tests Reviewed: Labs:10/12/17:  CBC/CMP  .  11/25/17: LIPIDS WNL        ASSESSMENT/ PLAN:  68 y/o white male with hx of schizophrenia with catatonic features, current regiment of olanzepine and fluoxetine working well. Although appear to be stable with appropriate thought process/content,initially has some concerns about increased anxiety and how Risperidal makes him feel not like himself, therefore switched to olanzapine and now doing much better overall.      #Hx of Schizophrenia- does not present negative symptoms.   -r/o MDD with catatonic features     -cont. Olanzepine 10mg PO QHS  -cont. zoloft (sertraline) 150mg PO QDaily - previous notes suggest patient has done really well with 250mg daily for his mood/anxiety. Consider titration in future if depression worsens.   -Cont. Trazodone 50mg PO QHS    -cont.propranolol 10mg PO BID for anxiety .  -Cont. Gabapentin 300mg PO QDaily for back pain   -f/u 6 weeks    Rom Linares M.D.

## 2018-04-16 ENCOUNTER — APPOINTMENT (OUTPATIENT)
Dept: INTERNAL MEDICINE | Facility: MEDICAL CENTER | Age: 68
End: 2018-04-16
Payer: MEDICARE

## 2018-05-15 ENCOUNTER — APPOINTMENT (OUTPATIENT)
Dept: BEHAVIORAL HEALTH | Facility: PHYSICIAN GROUP | Age: 68
End: 2018-05-15
Payer: MEDICARE

## 2018-05-22 ENCOUNTER — OFFICE VISIT (OUTPATIENT)
Dept: INTERNAL MEDICINE | Facility: MEDICAL CENTER | Age: 68
End: 2018-05-22
Payer: MEDICARE

## 2018-05-22 VITALS
BODY MASS INDEX: 32.07 KG/M2 | SYSTOLIC BLOOD PRESSURE: 126 MMHG | RESPIRATION RATE: 19 BRPM | HEIGHT: 70 IN | DIASTOLIC BLOOD PRESSURE: 86 MMHG | TEMPERATURE: 97.3 F | WEIGHT: 224 LBS | HEART RATE: 86 BPM | OXYGEN SATURATION: 96 %

## 2018-05-22 DIAGNOSIS — F41.9 ANXIETY AND DEPRESSION: ICD-10-CM

## 2018-05-22 DIAGNOSIS — R73.03 PREDIABETES: ICD-10-CM

## 2018-05-22 DIAGNOSIS — G89.29 CHRONIC MIDLINE LOW BACK PAIN WITHOUT SCIATICA: ICD-10-CM

## 2018-05-22 DIAGNOSIS — F32.A ANXIETY AND DEPRESSION: ICD-10-CM

## 2018-05-22 DIAGNOSIS — Z00.00 PREVENTATIVE HEALTH CARE: ICD-10-CM

## 2018-05-22 DIAGNOSIS — G89.29 CHRONIC PAIN OF RIGHT KNEE: ICD-10-CM

## 2018-05-22 DIAGNOSIS — M25.561 CHRONIC PAIN OF RIGHT KNEE: ICD-10-CM

## 2018-05-22 DIAGNOSIS — M54.50 CHRONIC MIDLINE LOW BACK PAIN WITHOUT SCIATICA: ICD-10-CM

## 2018-05-22 DIAGNOSIS — E78.5 DYSLIPIDEMIA: ICD-10-CM

## 2018-05-22 DIAGNOSIS — F17.209 NICOTINE DEPENDENCE WITH NICOTINE-INDUCED DISORDER, UNSPECIFIED NICOTINE PRODUCT TYPE: ICD-10-CM

## 2018-05-22 DIAGNOSIS — F20.2 CATATONIC SCHIZOPHRENIA (HCC): ICD-10-CM

## 2018-05-22 DIAGNOSIS — R10.11 RUQ PAIN: ICD-10-CM

## 2018-05-22 PROCEDURE — 99214 OFFICE O/P EST MOD 30 MIN: CPT | Mod: GC | Performed by: INTERNAL MEDICINE

## 2018-05-22 ASSESSMENT — PATIENT HEALTH QUESTIONNAIRE - PHQ9
5. POOR APPETITE OR OVEREATING: 1 - SEVERAL DAYS
CLINICAL INTERPRETATION OF PHQ2 SCORE: 1
SUM OF ALL RESPONSES TO PHQ QUESTIONS 1-9: 2

## 2018-05-22 ASSESSMENT — PAIN SCALES - GENERAL: PAINLEVEL: 6=MODERATE PAIN

## 2018-05-22 ASSESSMENT — ENCOUNTER SYMPTOMS: GENERAL WELL-BEING: GOOD

## 2018-05-22 ASSESSMENT — ACTIVITIES OF DAILY LIVING (ADL): BATHING_REQUIRES_ASSISTANCE: 0

## 2018-05-22 NOTE — PATIENT INSTRUCTIONS
Shingles  Shingles is an infection that causes a painful skin rash and fluid-filled blisters. Shingles is caused by the same virus that causes chickenpox.  Shingles only develops in people who:  · Have had chickenpox.  · Have gotten the chickenpox vaccine. (This is rare.)  The first symptoms of shingles may be itching, tingling, or pain in an area on your skin. A rash will follow in a few days or weeks. The rash is usually on one side of the body in a bandlike or beltlike pattern. Over time, the rash turns into fluid-filled blisters that break open, scab over, and dry up. Medicines may:  · Help you manage pain.  · Help you recover more quickly.  · Help to prevent long-term problems.  Follow these instructions at home:  Medicines  · Take medicines only as told by your doctor.  · Apply an anti-itch or numbing cream to the affected area as told by your doctor.  Blister and Rash Care  · Take a cool bath or put cool compresses on the area of the rash or blisters as told by your doctor. This may help with pain and itching.  · Keep your rash covered with a loose bandage (dressing). Wear loose-fitting clothing.  · Keep your rash and blisters clean with mild soap and cool water or as told by your doctor.  · Check your rash every day for signs of infection. These include redness, swelling, and pain that lasts or gets worse.  · Do not pick your blisters.  · Do not scratch your rash.  General instructions  · Rest as told by your doctor.  · Keep all follow-up visits as told by your doctor. This is important.  · Until your blisters scab over, your infection can cause chickenpox in people who have never had it or been vaccinated against it. To prevent this from happening, avoid touching other people or being around other people, especially:  ¨ Babies.  ¨ Pregnant women.  ¨ Children who have eczema.  ¨ Elderly people who have transplants.  ¨ People who have chronic illnesses, such as leukemia or AIDS.  Contact a doctor if:  · Your  pain does not get better with medicine.  · Your pain does not get better after the rash heals.  · Your rash looks infected. Signs of infection include:  ¨ Redness.  ¨ Swelling.  ¨ Pain that lasts or gets worse.  Get help right away if:  · The rash is on your face or nose.  · You have pain in your face, pain around your eye area, or loss of feeling on one side of your face.  · You have ear pain or you have ringing in your ear.  · You have loss of taste.  · Your condition gets worse.  This information is not intended to replace advice given to you by your health care provider. Make sure you discuss any questions you have with your health care provider.  Document Released: 06/05/2009 Document Revised: 08/13/2017 Document Reviewed: 09/29/2015  [employer] Interactive Patient Education © 2017 [employer]

## 2018-05-22 NOTE — PROGRESS NOTES
Established Patient    Boyd presents today with the following:    CC: Follow-up on chronic problems including hyperlipidemia, prediabetes, chronic back pain and also complaining of right upper quadrant pain    HPI: Patient is a 67-year-old male with past medical history of depression, anxiety, schizophrenia, hyperlipidemia, osteoarthritis, chronic back pain, psoriasis, prediabetes and tobacco abuse who presents today for follow-up on the above problems. Patient is complaining of right upper quadrant pain sharp in nature, intermittent, each episode lasts for 15 minutes and 9/10 in severity. No associated nausea, vomiting, constipation or diarrhea. No relation of the pain to the food. No fevers or chills reported. Regarding his depression and anxiety patient denied any suicidal or homicidal ideation. His symptoms are stable and he is following with psychiatry and currently on Zoloft and propranolol that he is tolerating without side effects. His chronic back pain is stable on gabapentin. Regarding his knee osteoarthritis, the patient could not see orthopedics due to insurance problems and currently his symptoms stable on ibuprofen as needed. The patient is exercising by walking 2 miles a day and he is trying to follow a healthy diet. He lost a few pounds over the last 2 months. She is still smoking about 3 cigarettes per week. Patient otherwise denied any complaints for today.    Patient Active Problem List    Diagnosis Date Noted   • Preventative health care 05/22/2018   • RUQ pain 05/22/2018   • Hx of schizophrenia 01/19/2018   • Schizophrenia (HCC) 11/28/2017   • History of osteomyelitis 05/05/2017   • Anxiety and depression 05/05/2017   • Prediabetes 05/05/2017   • Dyslipidemia 05/05/2017   • Nicotine dependence 03/03/2017   • PFO (patent foramen ovale) 07/06/2016   • Dilated cardiomyopathy (HCC) 07/30/2015   • Mitral regurgitation 07/25/2015   • GERD (gastroesophageal reflux disease) 11/01/2013   • ED  (erectile dysfunction) 03/08/2012   • Chronic pain of right knee 03/08/2012   • Chronic back pain 01/05/2010   • Venous insufficiency        Current Outpatient Prescriptions   Medication Sig Dispense Refill   • atorvastatin (LIPITOR) 20 MG Tab TAKE ONE TABLET BY MOUTH ONECE GUTIERREZ FOR 2 WEEKS THEN TAKE TWO TABLETS DAILY 180 Tab 0   • propranolol (INDERAL) 10 MG Tab Take 1 Tab by mouth 2 times a day. 60 Tab 5   • traZODone (DESYREL) 50 MG Tab TAKE ONE TABLET BY MOUTH AT BEDTIME 90 Tab 3   • sertraline (ZOLOFT) 100 MG Tab Take 1.5 Tabs by mouth every morning. 45 Tab 11   • olanzapine (ZYPREXA) 10 MG tablet Take 1 Tab by mouth every bedtime. 30 Tab 11   • gabapentin (NEURONTIN) 300 MG Cap Take 1 Cap by mouth 3 times a day. 90 Cap 5   • ibuprofen (MOTRIN) 600 MG Tab TAKE ONE TABLET BY MOUTH EVERY 8 HOURS AS NEEDED FOR MODERATE PAIN. 90 Tab 3   • betamethasone dipropionate (DIPROLENE) 0.05 % Ointment Apply to affected area 2 times a day 3 Tube 4   • Specialty Vitamins Products (HEALTHY HEART COMPLEX) Tab Take  by mouth.       No current facility-administered medications for this visit.            Review of Systems:     Constitutional: Denies fevers, Denies weight changes  Eyes: Denies changes in vision, no eye pain  Ears/Nose/Throat/Mouth: Denies nasal congestion or sore throat   Cardiovascular: Denies chest pain or palpitations   Abdomen: Right upper quadrant tenderness on deep palpation. Negative Tomas sign.  Respiratory: Denies shortness of breath , Denies cough  Gastrointestinal/Hepatic: Denies nausea, vomiting, diarrhea or constipation.  Genitourinary: Denies bladder dysfunction, dysuria or frequency  Musculoskeletal/Rheum: Chronic back pain with chronic right knee pain   Skin: Bilateral palm psoriasis rash  Neurological: Denies headache, confusion, memory loss or focal weakness/parasthesias  Psychiatric: denies mood disorder               /86 Comment: repeat bp 5 minutes  Pulse 86   Temp 36.3 °C (97.3 °F)    "Resp 19   Ht 1.778 m (5' 10\")   Wt 101.6 kg (224 lb)   SpO2 96%   BMI 32.14 kg/m²      Physical Exam   Constitutional:  Comfortable. No acute distress.   Eyes:  No scleral icterus.  Neck: Neck supple. No thyromegaly present.   Cardiovascular: Normal rate, regular rhythm and normal heart sounds.  Exam reveals no gallop and no friction rub.  No murmur heard.  Pulmonary/Chest: Lungs clear to ascultation bilaterally. Breath sounds normal. No rhonchi, wheezes or crackles.   Musculoskeletal:   No deformity noted. no edema.   Lymphadenopathy: no cervical adenopathy  Neurological: alert and oriented to person, place, and time.  No obvious motor or sensory deficits.  Extremities: No edema. No clubbing. No cyanosis. Distal pulses 2+ bilaterally.  Skin: Plaque psoriasis rash affecting the palms of the hands bilaterally more on the left side.      Assessment and Plan    1. Dyslipidemia  -Patient is currently trying to follow a healthy lifestyle.  -Counseling provided.  -His last lipid panel showed total cholesterol 199, triglycerides 269, HDL 54, LDL 91.  -Patient currently on atorvastatin 40 mg a day and he is tolerating without side effects. We'll continue for now.  -Will repeat lipid panel and adjust medications if needed  - LIPID PANEL    2. Prediabetes  -Last A1c in September 2017 was 5.9.  -Encouraged healthy lifestyle.    3. Chronic pain of right knee  -Osteoarthritis of the knee  -Plan was to get managed by orthopedics but patient could not get to see one due to insurance problems.  -Patient currently on ibuprofen as needed and symptoms are stable. We'll continue for now.    4. Chronic midline low back pain without sciatica  -No warning signs like weakness, numbness, bladder or bowel problems.  -Patient currently on gabapentin with good control of the symptoms. We'll continue for now.    5. Anxiety and depression  -Patient denies suicidal or homicidal ideation.  -Currently on Zoloft and propranolol with good control " of the symptoms.  -Patient is following with psychiatry and trying to get a new psychiatrist as the current one is leaving town.    6. RUQ pain  -Unclear etiology at this point. Possible cholelithiasis.  -We'll obtain liver function test and usual Q ultrasound.  - COMP METABOLIC PANEL; Future  - US-LIVER AND BILIARY TREE; Future    7. Catatonic schizophrenia (HCC)  -Currently on Olanzapine  -Symptoms are stable.  -Following with psychiatry.    8. Nicotine dependence with nicotine-induced disorder, unspecified nicotine product type  -3 cigars per week.  -Unwilling to quit at this point.  -Counseling provided.    9. Preventative health care  -Flu shot this year  -Pneumovax 2015.  -Tdap 2016.  -Colonoscopy was done 2016-07 polyps and recommended repeat in 3 years.  -Patient declined shingrix vaccine but provided with printouts to read and think about it.                  Signed by: Espinoza Doshi M.D.

## 2018-05-31 ENCOUNTER — HOSPITAL ENCOUNTER (OUTPATIENT)
Dept: RADIOLOGY | Facility: MEDICAL CENTER | Age: 68
End: 2018-05-31
Attending: INTERNAL MEDICINE
Payer: MEDICARE

## 2018-05-31 DIAGNOSIS — R10.11 RUQ PAIN: ICD-10-CM

## 2018-05-31 PROCEDURE — 76705 ECHO EXAM OF ABDOMEN: CPT

## 2018-06-06 ENCOUNTER — HOSPITAL ENCOUNTER (OUTPATIENT)
Dept: LAB | Facility: MEDICAL CENTER | Age: 68
End: 2018-06-06
Attending: INTERNAL MEDICINE
Payer: MEDICARE

## 2018-06-06 DIAGNOSIS — R10.11 RUQ PAIN: ICD-10-CM

## 2018-06-06 LAB
ALBUMIN SERPL BCP-MCNC: 4.6 G/DL (ref 3.2–4.9)
ALBUMIN/GLOB SERPL: 1.2 G/DL
ALP SERPL-CCNC: 67 U/L (ref 30–99)
ALT SERPL-CCNC: 21 U/L (ref 2–50)
ANION GAP SERPL CALC-SCNC: 5 MMOL/L (ref 0–11.9)
AST SERPL-CCNC: 22 U/L (ref 12–45)
BILIRUB SERPL-MCNC: 0.8 MG/DL (ref 0.1–1.5)
BUN SERPL-MCNC: 13 MG/DL (ref 8–22)
CALCIUM SERPL-MCNC: 9.9 MG/DL (ref 8.5–10.5)
CHLORIDE SERPL-SCNC: 106 MMOL/L (ref 96–112)
CHOLEST SERPL-MCNC: 201 MG/DL (ref 100–199)
CO2 SERPL-SCNC: 27 MMOL/L (ref 20–33)
CREAT SERPL-MCNC: 0.83 MG/DL (ref 0.5–1.4)
GLOBULIN SER CALC-MCNC: 3.8 G/DL (ref 1.9–3.5)
GLUCOSE SERPL-MCNC: 108 MG/DL (ref 65–99)
HDLC SERPL-MCNC: 60 MG/DL
LDLC SERPL CALC-MCNC: 102 MG/DL
POTASSIUM SERPL-SCNC: 4.5 MMOL/L (ref 3.6–5.5)
PROT SERPL-MCNC: 8.4 G/DL (ref 6–8.2)
SODIUM SERPL-SCNC: 138 MMOL/L (ref 135–145)
TRIGL SERPL-MCNC: 195 MG/DL (ref 0–149)

## 2018-06-06 PROCEDURE — 36415 COLL VENOUS BLD VENIPUNCTURE: CPT

## 2018-06-06 PROCEDURE — 80053 COMPREHEN METABOLIC PANEL: CPT

## 2018-06-06 PROCEDURE — 80061 LIPID PANEL: CPT

## 2018-06-11 ENCOUNTER — TELEPHONE (OUTPATIENT)
Dept: INTERNAL MEDICINE | Facility: MEDICAL CENTER | Age: 68
End: 2018-06-11

## 2018-06-11 NOTE — TELEPHONE ENCOUNTER
1. Caller Name: Boyd                      Call Back Number: [phone] (home)       2. Message: Pt called and l/m.. Wanting to know his lab/us results.     3. Patient approves office to leave a detailed voicemail/MyChart message: N\A    Called and spoke with pt. Notified pt of labs/US results. Pt understood

## 2018-07-27 ENCOUNTER — HOSPITAL ENCOUNTER (EMERGENCY)
Facility: MEDICAL CENTER | Age: 68
End: 2018-07-27
Attending: EMERGENCY MEDICINE
Payer: MEDICARE

## 2018-07-27 VITALS
BODY MASS INDEX: 33.31 KG/M2 | RESPIRATION RATE: 20 BRPM | DIASTOLIC BLOOD PRESSURE: 69 MMHG | TEMPERATURE: 96.8 F | HEART RATE: 88 BPM | HEIGHT: 69 IN | OXYGEN SATURATION: 93 % | SYSTOLIC BLOOD PRESSURE: 99 MMHG | WEIGHT: 224.87 LBS

## 2018-07-27 DIAGNOSIS — F10.20 ALCOHOLISM (HCC): ICD-10-CM

## 2018-07-27 PROCEDURE — 99284 EMERGENCY DEPT VISIT MOD MDM: CPT

## 2018-07-27 PROCEDURE — 90791 PSYCH DIAGNOSTIC EVALUATION: CPT

## 2018-07-27 NOTE — DISCHARGE INSTRUCTIONS
Call the organizations on the referral list today and arrange further care for alcoholism.  Return here if you otherwise feel you need emergency medical care.

## 2018-07-27 NOTE — ED NOTES
Patient discharged w follow up information, provided by Serenity KITCHEN, [facility], patient has no questions, ambulated to lobby w/o difficulty

## 2018-07-27 NOTE — DISCHARGE PLANNING
[facility]  Crisis/Safety Plan    Name:  Boyd Askew  MRN:  [id number]  Date:  2018    Warning signs that a crisis may be developing for me or I may be at risk:  1) Need to stop drinking  2)   3)    Coping strategies I can use on my own (relaxation, physical activity, etc):  1) Walk  2) Coloring  3) Music    Ways I can make my environment safe:  1) No alcohol  2)  3)    Things I want to tell myself when I feel a crisis developin) It's going to bet better  2) Doesn't have to be like this  3)     People I can contact for support or distraction (and their phone numbers):  1)  Wife, Elizabeth  2)  Step-son, Bonifacio  3) Susan    If I’m not able to reach my support people, or the above strategies don’t help, I can contact the following professionals, agencies, or hotlines:  1) Crisis Call Center ():  [phone] -OR- [phone]  2) Crisis Text Line ():  Text START to 821053  3) Alcohol rehab  4) AA meetings    Serenity Mathews R.N.
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
## 2018-07-27 NOTE — ED TRIAGE NOTES
Patient states he wants to quit drinking alcohol, drinks 2 1/2 bottles of wine, 1-2 pints per day of vodka, quit drinking [id number], wife sent him today, patients takes Olanzapine, Zoloft, Propanolol, trazedone,  Takes meds as directed, wife gives it to him she demanded he come to get help to stop drinking, patient states he does not want lose his wife, denies SI

## 2018-07-28 ENCOUNTER — PATIENT OUTREACH (OUTPATIENT)
Dept: HEALTH INFORMATION MANAGEMENT | Facility: OTHER | Age: 68
End: 2018-07-28

## 2018-07-28 NOTE — ED PROVIDER NOTES
ED Provider Note    CHIEF COMPLAINT  Chief Complaint   Patient presents with   • Other     wants to quit drinking       HPI  Boyd Askew is a 67 y.o. male who presents to the emergency department here stating that he wants to quit drinking alcohol.  The patient is a regular drinker of alcohol including today and at the time of arrival clinically he appears sober and says that he wants to quit drinking alcohol.  He is not ill and he does not wish to harm himself or others    REVIEW OF SYSTEMS no fever or chills no nausea vomiting no chest pain or difficulty breathing no abdominal pain no black or bloody stool or emesis no wish to harm self or others.  All other systems negative    PAST MEDICAL HISTORY  Past Medical History:   Diagnosis Date   • Arthritis     back and knees   • Back pain    • Breath shortness    • GERD (gastroesophageal reflux disease)    • Heart burn    • Indigestion    • Other specified disorder of intestines     constipation   • Psychiatric problem     depression   • Torn ACL (anterior cruciate ligament) left    3/2009   • Torn meniscus 2005    right   • Torn meniscus 2000    left   • Venous insufficiency left leg       FAMILY HISTORY  Family History   Problem Relation Age of Onset   • Stroke Mother    • Heart Disease Father        SOCIAL HISTORY  Social History     Social History   • Marital status:      Spouse name: N/A   • Number of children: N/A   • Years of education: N/A     Social History Main Topics   • Smoking status: Current Some Day Smoker     Packs/day: 0.00     Years: 30.00     Types: Cigarettes     Start date: 7/22/1975   • Smokeless tobacco: Never Used      Comment: using patch since 7/22/15   • Alcohol use 0.0 oz/week      Comment: social rare   • Drug use: No   • Sexual activity: Yes     Partners: Male     Other Topics Concern   • Not on file     Social History Narrative   • No narrative on file       SURGICAL HISTORY  Past Surgical History:   Procedure  "Laterality Date   • GASTROSCOPY WITH POLYPECTOMY  oct 2016    x7 polyps   • RECOVERY  8/4/2015    Procedure: CATH LAB  [facility] WITH POSSIBLE ICHINO ICD9: 425.4;  Surgeon: [facility];  Location: SURGERY PRE-POST PROC UNIT [facility];  Service:    • RHINOPLASTY  3/15/2010    Performed by REINA LOPEZ at SURGERY SAME DAY [facility] ORS   • SEPTOTURBINOPLASTY  3/15/2010    Performed by REINA LOPEZ at SURGERY SAME DAY [facility] ORS   • SINUSOTOMIES  3/15/2010    Performed by REINA LOPEZ at SURGERY SAME DAY [facility] ORS   • ETHMOIDECTOMY  3/15/2010    Performed by REINA LOPEZ at SURGERY SAME DAY [facility] ORS   • KNEE ARTHROSCOPY  2/1/2010    Performed by TANNA STACY at SURGERY [facility] ORS   • MEDIAL MENISCECTOMY  2/1/2010    Performed by TANNA STACY at SURGERY [facility] ORS   • MENISCECTOMY  2/1/2010    Performed by TANNA STACY at SURGERY [facility] ORS   • LAMINOTOMY  1996    L3-L5   • OTHER ORTHOPEDIC SURGERY      L3- S1 FUSION// RT KNEE       CURRENT MEDICATIONS  Home Medications    **Home medications have not yet been reviewed for this encounter**         ALLERGIES  No Known Allergies    PHYSICAL EXAM  VITAL SIGNS: BP (!) 99/69   Pulse 88   Temp 36 °C (96.8 °F)   Resp 20   Ht 1.753 m (5' 9\")   Wt 102 kg (224 lb 13.9 oz)   SpO2 93%   BMI 33.21 kg/m²    Oxygen saturation is interpreted as adequate  Constitutional: Awake verbal ambulatory and well-appearing individual in no distress  HENT: No sign of trauma to the head  Eyes: No erythema discharge or jaundice  Neck: Trachea midline no JVD  Cardiovascular: Regular rate and rhythm  Lungs: Clear and equal bilaterally with no apparent difficulty breathing  Skin: Warm and dry  Musculoskeletal: No acute bony deformity  Neurologic: Awake verbal moving all extremities with no difficulty      MEDICAL DECISION MAKING and DISPOSITION  The patient has been seen by our  and I have reviewed the case with her.  The patient " does not appear to be in acute alcohol withdrawal at this time and does not require hospitalization.  The patient has been given a list of outpatient alcohol treatment programs where he may have obtained treatment for alcoholism.  The patient is instructed to call these facilities today and arrange follow-up as soon as possible.  If he feels that he needs emergency medical care he is to return here at once for recheck    IMPRESSION  1.  Alcoholism      Electronically signed by: Santos Woo, 7/27/2018 8:13 PM

## 2018-08-29 DIAGNOSIS — E78.5 DYSLIPIDEMIA: ICD-10-CM

## 2018-08-30 RX ORDER — ATORVASTATIN CALCIUM 20 MG/1
20 TABLET, FILM COATED ORAL
Qty: 90 TAB | Refills: 0 | Status: ON HOLD | OUTPATIENT
Start: 2018-08-30 | End: 2021-02-08 | Stop reason: SDUPTHER

## 2018-08-30 NOTE — TELEPHONE ENCOUNTER
Last seen: 05/22/18 by Dr. Doshi  Next appt: None    Was the patient seen in the last year in this department? Yes   Does patient have an active prescription for medications requested? No   Received Request Via: Pharmacy

## 2018-10-22 ENCOUNTER — HOSPITAL ENCOUNTER (EMERGENCY)
Facility: MEDICAL CENTER | Age: 68
End: 2018-10-23
Attending: EMERGENCY MEDICINE
Payer: MEDICARE

## 2018-10-22 LAB — POC BREATHALIZER: 0 PERCENT (ref 0–0.01)

## 2018-10-22 PROCEDURE — 302970 POC BREATHALIZER

## 2018-10-22 PROCEDURE — 99285 EMERGENCY DEPT VISIT HI MDM: CPT

## 2018-10-22 RX ORDER — HYDROXYZINE HYDROCHLORIDE 10 MG/1
10 TABLET, FILM COATED ORAL EVERY EVENING
COMMUNITY
End: 2019-01-21

## 2018-10-22 ASSESSMENT — PAIN SCALES - GENERAL: PAINLEVEL_OUTOF10: 4

## 2018-10-23 VITALS
HEART RATE: 72 BPM | TEMPERATURE: 98.9 F | OXYGEN SATURATION: 95 % | BODY MASS INDEX: 33.14 KG/M2 | WEIGHT: 223.77 LBS | RESPIRATION RATE: 16 BRPM | DIASTOLIC BLOOD PRESSURE: 72 MMHG | SYSTOLIC BLOOD PRESSURE: 110 MMHG | HEIGHT: 69 IN

## 2018-10-23 PROCEDURE — 700102 HCHG RX REV CODE 250 W/ 637 OVERRIDE(OP): Performed by: EMERGENCY MEDICINE

## 2018-10-23 PROCEDURE — A9270 NON-COVERED ITEM OR SERVICE: HCPCS | Performed by: EMERGENCY MEDICINE

## 2018-10-23 RX ORDER — ATORVASTATIN CALCIUM 10 MG/1
20 TABLET, FILM COATED ORAL
Status: DISCONTINUED | OUTPATIENT
Start: 2018-10-23 | End: 2018-10-23 | Stop reason: HOSPADM

## 2018-10-23 RX ORDER — SERTRALINE HYDROCHLORIDE 100 MG/1
150 TABLET, FILM COATED ORAL DAILY
Status: SHIPPED | COMMUNITY
End: 2019-04-23

## 2018-10-23 RX ORDER — OLANZAPINE 5 MG/1
10 TABLET, ORALLY DISINTEGRATING ORAL
Status: DISCONTINUED | OUTPATIENT
Start: 2018-10-23 | End: 2018-10-23 | Stop reason: HOSPADM

## 2018-10-23 RX ORDER — TRAZODONE HYDROCHLORIDE 50 MG/1
50 TABLET ORAL
Status: DISCONTINUED | OUTPATIENT
Start: 2018-10-23 | End: 2018-10-23 | Stop reason: HOSPADM

## 2018-10-23 RX ORDER — PROPRANOLOL HYDROCHLORIDE 20 MG/1
10 TABLET ORAL TWICE DAILY
Status: DISCONTINUED | OUTPATIENT
Start: 2018-10-23 | End: 2018-10-23 | Stop reason: HOSPADM

## 2018-10-23 RX ORDER — HYDROXYZINE HYDROCHLORIDE 10 MG/1
10 TABLET, FILM COATED ORAL EVERY EVENING
Status: DISCONTINUED | OUTPATIENT
Start: 2018-10-23 | End: 2018-10-23 | Stop reason: HOSPADM

## 2018-10-23 RX ADMIN — SERTRALINE HYDROCHLORIDE 150 MG: 50 TABLET ORAL at 15:09

## 2018-10-23 RX ADMIN — HYDROXYZINE HYDROCHLORIDE 10 MG: 10 TABLET ORAL at 18:15

## 2018-10-23 RX ADMIN — PROPRANOLOL HYDROCHLORIDE 10 MG: 20 TABLET ORAL at 18:15

## 2018-10-23 NOTE — ED NOTES
Report to Jami KITCHEN. Patient appears asleep in bed. Regular unlabored respirations visualized. Sitter remains outside room to continue direct observation.

## 2018-10-23 NOTE — CONSULTS
[facility]   TRIAGE ASSESSMENT    Name: Boyd Askew  MRN: [id number]  : 1950  Age: 68 y.o.  Date of assessment: 10/22/2018  PCP: Espinoza Doshi M.D.  Persons in attendance: Patient    CHIEF COMPLAINT/PRESENTING ISSUE (as stated by Boyd Askew):   Mr Askew has overdosed in the past; he is in a very high risk group for completed suicides (age, male, alcohol daily, serious financial and marital problems), (see below).,  Chief Complaint   Patient presents with   • Suicidal Ideation     BIB by J[facility] from [facility], pt, walked in and stated he had thoughts of suicide, by overdosing on medications        CURRENT LIVING SITUATION/SOCIAL SUPPORT: lives with his wife but they have received a 30 day eviction notice.    BEHAVIORAL HEALTH TREATMENT HISTORY  Does patient/parent report a history of prior behavioral health treatment for patient?   Yes:    Dates Level of Care Facilty/Provider Diagnosis/Problem Medications   August and 2018 inpatient [facility] MDD Olanzapine, Zoloft, Propanolol Trazodone, Hydroxyzine                                                                        SAFETY ASSESSMENT - SELF  Does patient acknowledge current or past symptoms of dangerousness to self? yes  Does parent/significant other report patient has current or past symptoms of dangerousness to self? N\A  Does presenting problem suggest symptoms of dangerousness to self? Yes:     Past Current    Suicidal Thoughts: [x]  [x]    Suicidal Plans: [x]  [x]    Suicidal Intent: [x]  [x]    Suicide Attempts: [x]  []    Self-Injury []  []      For any boxes checked above, provide detail: overdose on Soma 10 years ago      History of suicide by family member: yes - mother attempted, not successful  History of suicide by friend/significant other: no  Recent change in frequency/specificity/intensity of suicidal thoughts or self-harm behavior? yes - past few months  Current  "access to firearms, medications, or other identified means of suicide/self-harm? yes - pills  If yes, willing to restrict access to means of suicide/self-harm? no  Protective factors present:  Actively engaged in treatment and Willing to address in treatment    SAFETY ASSESSMENT - OTHERS  Does patient acknowledge current or past symptoms of aggressive behavior or risk to others? no  Does parent/significant other report patient has current or past symptoms of aggressive behavior or risk to others?  N\A  Does presenting problem suggest symptoms of dangerousness to others? No    Crisis Safety Plan completed and copy given to patient? no    ABUSE/NEGLECT SCREENING  Does patient report feeling “unsafe” in his/her home, or afraid of anyone?  no  Does patient report any history of physical, sexual, or emotional abuse?  no  Does parent or significant other report any of the above? N\A  Is there evidence of neglect by self?  no  Is there evidence of neglect by a caregiver? no  Does the patient/parent report any history of CPS/APS/police involvement related to suspected abuse/neglect or domestic violence? no  Based on the information provided during the current assessment, is a mandated report of suspected abuse/neglect being made?  No    SUBSTANCE USE SCREENING  Yes:  Gurpreet all substances used in the past 30 days:      Last Use Amount   [x]   Alcohol This morning A couple bottles of wine a couple of beers/maybe varies a bit, but daily   [x]   Marijuana daily 3 grams/week   []   Heroin     []   Prescription Opioids  (used without prescription, for    recreation, or in excess of prescribed amount)     []   Other Prescription  (used without prescription, for    recreation, or in excess of prescribed amount)     []   Cocaine      []   Methamphetamine     []   \"\" drugs (ectasy, MDMA)     []   Other substances        UDS results:   Breathalyzer results:     What consequences does the patient associate with any of the above " substance use and or addictive behaviors? Relationship problems:, Family problems:     Risk factors for detox (check all that apply):  []  Seizures   [x]  Diaphoretic (sweating)   []  Tremors   []  Hallucinations   []  Increased blood pressure   []  Decreased blood pressure   []  Other   []  None      [x] Patient education on risk factors for detoxification and instructed to return to ER as needed.      MENTAL STATUS   Participation: Active verbal participation, Attentive and Engaged  Grooming: Casual and Neat  Orientation: Alert and Fully Oriented  Behavior: Tense  Eye contact: Good  Mood: Depressed and Anxious  Affect: Blunted, Congruent with content, Sad and Anxious  Thought process: Logical and Goal-directed  Thought content: Within normal limits  Speech: Rate within normal limits and Volume within normal limits  Perception: Within normal limits  Memory:  No gross evidence of memory deficits  Insight: Adequate  Judgment:  Adequate  Other:    Collateral information:   Source:  [] Significant other present in person:   [] Significant other by telephone  [] [facility]   [] [facility]  [] [facility]  [] Other:     [] Unable to complete full assessment due to:  [] Acute intoxication  [] Patient declined to participate/engage  [] Patient verbally unresponsive  [] Significant cognitive deficits  [] Significant perceptual distortions or behavioral disorganization  [] Other:      CLINICAL IMPRESSIONS:  Primary:    Secondary:         IDENTIFIED NEEDS/PLAN:  [Trigger DISPOSITION list for any items marked]    [x]  Imminent safety risk - self [] Imminent safety risk - others   []  Acute substance withdrawal []  Psychosis/Impaired reality testing   [x]  Mood/anxiety [x]  Substance use/Addictive behavior   [x]  Maladaptive behaviro []  Parent/child conflict   []  Family/Couples conflict []  Biomedical   []  Housing []  Financial   []   Legal  Occupational/Educational   []  Domestic violence []   "Other:     Disposition: Refer to [facility]    Does patient express agreement with the above plan? yes    Referral appointment(s) scheduled? no    Alert team only: 68 year old male came to the ED as he was experiencing suicidal thoughtsI and because of his particularly high risk for suicide he will stay on the legal hold; he was at [facility] hospital in August and September of this year \"but I only stayed a couple of days, I think I need to stay longer this time\". He went to [facility] prior to going to the ED today and reports he was told that they would have an available bed for him tomorrow.   His wife told him she would not be there when he returned; there are marital problems and they have received a 30 day eviction notice from there home. Mr. Askew was straight forward in his responses and had some insight that this was a serious situation for him.  His affect was somewhat blunted, he is depressed and anxious  The referral is for inpatient hospitalization for his safety and tx.  . I  have discussed findings and recommendations with Dr. Mondragon who is in agreement with these recommendations.     Referral information sent to the following community providers :          Cely Vega R.N.  10/22/2018              "

## 2018-10-23 NOTE — ED NOTES
Pt in bed, ate sandwich and snacks provided, pt now laying on side, appears to be watching TV, regular respirations visualized.

## 2018-10-23 NOTE — ED NOTES
Pt BIB [facility] from [facility] after walking in voluntarily and stating he has SI and a plan to overdose on his medications, and was placed on legal hold. Pt states he has attempted suicide in past year by overdosing on soma. Pt has been at [facility] before for inpatient care. Pt states he is worried his wife will leave him, and does have two children and one step child he has little contact with and support from. Pt states he has been diagnosed with schizophrenia, depression, is an alcoholic, and did not take his medications today, just had 2x  32oz beers, with his last drink about 1400 today. Pt calm cooperative, is upbeat, and pleasant.

## 2018-10-23 NOTE — DISCHARGE PLANNING
RIOS spoke with [facility] and was informed that patient is accepted and can come anytime after 9:30 pm accepting MD estefania Rivera.

## 2018-10-23 NOTE — ED PROVIDER NOTES
ED Provider Note    CHIEF COMPLAINT  Chief Complaint   Patient presents with   • Suicidal Ideation     BIB by J[facility] from [facility], pt, walked in and stated he had thoughts of suicide, by overdosing on medications       HPI  Boyd Askew is a 68 y.o. male who presents with chief complaint of suicidal ideation. Patient with a history of depression, schizophrenia here with suicidal ideation. He reports that he is hearing voices that are telling him to take an overdose. He denies any recent overdoses or recent attempts. Patient denies any homicidal ideation. Patient denies any illicit drug use, he does drink regularly. His last drink was this morning.    REVIEW OF SYSTEMS  Review of Systems   All other systems reviewed and are negative.    See HPI for further details. All other systems are negative.     PAST MEDICAL HISTORY   has a past medical history of Arthritis; Back pain; Breath shortness; GERD (gastroesophageal reflux disease); Heart burn; Indigestion; Other specified disorder of intestines; Psychiatric problem; Torn ACL (anterior cruciate ligament) (left); Torn meniscus (2005); Torn meniscus (2000); and Venous insufficiency (left leg).    SOCIAL HISTORY  Social History     Social History Main Topics   • Smoking status: Current Some Day Smoker     Packs/day: 0.00     Years: 30.00     Types: Cigarettes     Start date: 7/22/1975   • Smokeless tobacco: Never Used      Comment: using patch since 7/22/15   • Alcohol use 0.0 oz/week      Comment: social rare   • Drug use: No   • Sexual activity: Yes     Partners: Male       SURGICAL HISTORY   has a past surgical history that includes other orthopedic surgery; laminotomy (1996); knee arthroscopy (2/1/2010); medial meniscectomy (2/1/2010); meniscectomy (2/1/2010); rhinoplasty (3/15/2010); septoturbinoplasty (3/15/2010); sinusotomies (3/15/2010); ethmoidectomy (3/15/2010); recovery (8/4/2015); and gastroscopy with polypectomy (oct  2016).    CURRENT MEDICATIONS  Home Medications     Reviewed by Shawna Buitrago R.N. (Registered Nurse) on 10/22/18 at 2130  Med List Status: Partial   Medication Last Dose Status   atorvastatin (LIPITOR) 20 MG Tab 10/21/2018 Active   gabapentin (NEURONTIN) 300 MG Cap 5/22/2018 Active   hydrOXYzine HCl (ATARAX) 10 MG Tab 10/21/2018 Active   ibuprofen (MOTRIN) 600 MG Tab 5/22/2018 Active   olanzapine (ZYPREXA) 10 MG tablet 10/21/2018 Active   propranolol (INDERAL) 10 MG Tab 10/21/2018 Active   sertraline (ZOLOFT) 100 MG Tab 10/21/2018 Active   Specialty Vitamins Products (HEALTHY HEART COMPLEX) Tab 5/22/2018 Active   traZODone (DESYREL) 50 MG Tab 10/21/2018 Active                ALLERGIES  No Known Allergies    PHYSICAL EXAM  Physical Exam   Constitutional: He is oriented to person, place, and time. He appears well-developed and well-nourished.   HENT:   Head: Normocephalic.   Eyes: Pupils are equal, round, and reactive to light. Conjunctivae are normal.   Neck: Normal range of motion. Neck supple.   Cardiovascular: Normal rate and regular rhythm.    Pulmonary/Chest: Effort normal and breath sounds normal.   Abdominal: Soft. Bowel sounds are normal. He exhibits no distension. There is no tenderness. There is no rebound.   Musculoskeletal: Normal range of motion.   No tremor   Neurological: He is alert and oriented to person, place, and time.   Skin: Skin is warm and dry.   Psychiatric:   Flat affect, suicidal ideation with plan to take pills, no homicidal ideation, auditory hallucinations, denies vh         COURSE & MEDICAL DECISION MAKING  Pertinent Labs & Imaging studies reviewed. (See chart for details)  Patient here with depression suicidal ideation and schizophrenia.  He has not currently displaying any signs of agitation or alcohol withdrawal otherwise.  We will continue to monitor patient to ensure that he does not escalate into dangerous alcohol withdrawal given his history of alcohol abuse but patient  will also need to be admitted to a psychiatric facility for ongoing care.  I discussed with nursing to ensure the patient is closely monitored throughout his stay here for any signs of agitation and if these occur to immediately notify the physician.  Patient has been evaluated by our psychiatric staff and they agree he warrants admission for psychiatric care.  Patient will be placed on a hold.      FINAL IMPRESSION  1.  Suicidal ideation, schizophrenia         Electronically signed by: Tayo Mondragon, 10/22/2018 9:30 PM

## 2018-10-23 NOTE — ED NOTES
Report from Alexandra. Rounded on pt. Pt asking when lunch is. Informed pt when lunch is and a lunch tray will be ordered for him. Pt watching TV at this time, calm and cooperative. Pt remains in view of sitter.

## 2018-10-23 NOTE — DISCHARGE PLANNING
Received Choice form at [phone]  Agency/Facility Name: [facility]   Referral sent per Choice form @ [phone]

## 2018-10-23 NOTE — ED NOTES
Direct observations continued with RN sitter.  Patient is resting comfortably in bed.  No signs of distress. Even non-labored breathing.  No tremors or signs of anxiety.

## 2018-10-23 NOTE — ED NOTES
Assumed patient care. Pt assesement done.  Plan of care reviewed with patient. Pt sitting up eating breakfast.

## 2018-10-23 NOTE — ED NOTES
Patient appears asleep in bed. Regular unlabored respirations visualized. Sitter remains outside room.

## 2018-10-23 NOTE — ED NOTES
Pt medicated with zoloft as ordered. Pt calm and cooperative at this time. Pt sitting at edge of bed drinking water. Pt remains on direct observation in view of sitter at all times.

## 2018-10-23 NOTE — ED NOTES
Patient sleeping.  No signs of distress.  Even, unlabored breathing.  Direct observations continued.

## 2018-10-23 NOTE — ED NOTES
Discussed restarting home meds with Dr Loyd. Per Dr Loyd, ok to re-start pt's home meds. Verbal order received with read back.

## 2018-10-23 NOTE — ED NOTES
Patient appears asleep in bed. Regular unlabored respirations visualized. Sitter outside room for direct observation.

## 2018-10-24 NOTE — ED NOTES
Report to Ai. Noemi remains in view of pt at all times. Sleeping at this time. Calm and cooperative. Rise and fall of chest noted.

## 2018-10-24 NOTE — ED NOTES
Report given to JAYLEN, pt is transferring to [facility] via ambulance d/t legal hold in place. Pt has remained on direct observation while here in the ER. Pt calm and cooperative and ambulating to ambulance in stable condition.

## 2018-10-24 NOTE — DISCHARGE PLANNING
RIOS called JAYLEN ([facility]) and scheduled patient to be transported to [facility] at 8:30 pm. [facility] PCS form faxed to [facility].

## 2018-10-24 NOTE — ED NOTES
Pt given dinner tray and is eating now. Pt updated on plan to transfer to [facility] later tonight. Pt is calm and cooperative.

## 2018-10-31 ENCOUNTER — HOSPITAL ENCOUNTER (OUTPATIENT)
Dept: BEHAVIORAL HEALTH | Facility: MEDICAL CENTER | Age: 68
End: 2018-10-31
Attending: PSYCHIATRY & NEUROLOGY
Payer: MEDICARE

## 2018-10-31 DIAGNOSIS — F25.1 SCHIZOAFFECTIVE DISORDER, DEPRESSIVE TYPE (HCC): ICD-10-CM

## 2018-10-31 DIAGNOSIS — F10.20 ACUTE ALCOHOLISM (HCC): ICD-10-CM
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
## 2018-11-05 NOTE — PROGRESS NOTES
I have reviewed the note by JOEL Kyle and agree with the assessment and treatment plan.    1. Admit to Intensive Outpatient Program on 11/19/18    - Group therapy per schedule,    - Psychoeducational groups per schedule,   - Individual/family counseling sessions with  per treatment plan.  2. Symptoms necessitating Intensive Outpatient Treatment: excessive alcohol use, depression, paranoia  3. Medical screening/Physical exam per primary care provider or referring facility.

## 2018-11-19 ENCOUNTER — HOSPITAL ENCOUNTER (OUTPATIENT)
Dept: BEHAVIORAL HEALTH | Facility: MEDICAL CENTER | Age: 68
End: 2018-11-19
Attending: PSYCHIATRY & NEUROLOGY
Payer: MEDICARE

## 2018-11-23 ENCOUNTER — HOSPITAL ENCOUNTER (OUTPATIENT)
Dept: BEHAVIORAL HEALTH | Facility: MEDICAL CENTER | Age: 68
End: 2018-11-23
Attending: PSYCHIATRY & NEUROLOGY
Payer: MEDICARE

## 2018-11-23 DIAGNOSIS — F20.2 CATATONIC SCHIZOPHRENIA (HCC): ICD-10-CM

## 2018-11-23 DIAGNOSIS — F41.9 ANXIETY AND DEPRESSION: ICD-10-CM

## 2018-11-23 DIAGNOSIS — F32.A ANXIETY AND DEPRESSION: ICD-10-CM

## 2018-11-23 PROCEDURE — 90853 GROUP PSYCHOTHERAPY: CPT

## 2018-11-23 NOTE — BH THERAPY
Group Therapy Checklist  Attendance: Attended  Attendance Duration (min):  (60)  Number of Participants: 12  Program/Group: Intensive Outpatient Program  Topics Covered: ACT conept intro  Participation: Active verbal participation  Affect/Mood Range: Normal range, Flexible  Affect/Mood Display: Congruent w/content  Cognition: Alert, Oriented  Evidence of Imminent Suicide Risk: No  Evidence of imminent homicide risk: No  Therapeutic Interventions: Cognitive clarification, Values clarification  Progress Toward Treatment Goal: Mild improvement

## 2018-11-23 NOTE — BH THERAPY
Group Therapy Checklist  Attendance: Attended  Attendance Duration (min):  (90)  Number of Participants: 12  Program/Group: Intensive Outpatient Program  Topics Covered:  (process group)  Participation: Active verbal participation, Attentive, Supportive to other group members, Open to feedback  Affect/Mood Range: Normal range, Flexible  Affect/Mood Display: Congruent w/content  Cognition: Alert, Oriented  Evidence of Imminent Suicide Risk: No  Evidence of imminent homicide risk: No  Therapeutic Interventions: Emotion clarification, Supportive psychotherapy  Progress Toward Treatment Goal: Mild improvement  Lamin introduced himself to the group. He just moved into a home after saving for a couple of years with wife as they have lived in a motel; he is very happy with the move. Processed weekend plans and included use of emotional skills and tools of recovery. Receptive to peer support.

## 2018-11-26 ENCOUNTER — HOSPITAL ENCOUNTER (OUTPATIENT)
Dept: BEHAVIORAL HEALTH | Facility: MEDICAL CENTER | Age: 68
End: 2018-11-26
Attending: PSYCHIATRY & NEUROLOGY
Payer: MEDICARE

## 2018-11-26 DIAGNOSIS — F41.9 ANXIETY AND DEPRESSION: ICD-10-CM

## 2018-11-26 DIAGNOSIS — F32.A ANXIETY AND DEPRESSION: ICD-10-CM

## 2018-11-26 PROCEDURE — 90853 GROUP PSYCHOTHERAPY: CPT

## 2018-11-28 ENCOUNTER — HOSPITAL ENCOUNTER (OUTPATIENT)
Dept: BEHAVIORAL HEALTH | Facility: MEDICAL CENTER | Age: 68
End: 2018-11-28
Attending: PSYCHIATRY & NEUROLOGY
Payer: MEDICARE

## 2018-11-28 DIAGNOSIS — F20.2 CATATONIC SCHIZOPHRENIA (HCC): ICD-10-CM

## 2018-11-28 DIAGNOSIS — F41.9 ANXIETY AND DEPRESSION: ICD-10-CM

## 2018-11-28 DIAGNOSIS — F32.A ANXIETY AND DEPRESSION: ICD-10-CM

## 2018-11-28 DIAGNOSIS — F10.20 ACUTE ALCOHOLISM (HCC): ICD-10-CM

## 2018-11-28 PROCEDURE — 90853 GROUP PSYCHOTHERAPY: CPT

## 2018-11-28 PROCEDURE — 90832 PSYTX W PT 30 MINUTES: CPT | Mod: XU

## 2018-11-28 NOTE — BH THERAPY
Group Therapy Checklist  Attendance: Attended  Attendance Duration (min):  (60)  Number of Participants: 8  Program/Group: Intensive Outpatient Program  Topics Covered: Chalk talk  Participation: Active verbal participation, Attentive  Affect/Mood Range: Normal range, Flexible  Affect/Mood Display: Congruent w/content  Cognition: Alert, Oriented  Evidence of Imminent Suicide Risk: No  Evidence of imminent homicide risk: No  Therapeutic Interventions: Cognitive clarification, Relapse prevention  Progress Toward Treatment Goal: Mild improvement

## 2018-11-28 NOTE — CARE PLAN
Problem: Substance Induced Symptoms  Goal: Achieve stable functioning  Pt will abstain from all drugs of abuse while in program and apply recovery skills using 12 step model as evidenced by verbalizing his abstinence, and his strategies for managing triggers to use by IOP discharge.     Intervention: Individual Counseling Sessions   [facility]  Therapy Progress Note    Patient Name: Boyd Askew  Patient MRN: [id number]  Today's Date: 11/28/2018     Type of session:Individual psychotherapy  Length of session: 30 minutes  Persons in attendance:Patient    Subjective/New Info: initial session. Lamin forgot his assignments. Discussed his struggles to say sober; he admits he drank last night. Reinforced the need to be abstinent in program, discussed residential treatment options if unable to stay sober. Gave a list of AA meetings that are within walking distance from his home; Lamin uses public transportation. His wife expressed her concerns at breakfast today; they have been  34 years. Encouraged follow up appt for psychiatrist as well to med manage his schizophrenia; appt with [facility] made today.     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 Not Indicated   If evidence of imminent risk is present, intervention/plan:     Therapeutic Intervention(s): Develop/modify treatment plan, Establish rapport, Goal-setting,  "Limit-setting, Maladaptive behavior addressed and Supportive psychotherapy    Treatment Goal(s)/Objective(s) addressed: develop treatment plan, establish rapport, abstinence     Progress toward Treatment Goals: Mild decline    Plan:  - Continue Intensive Outpatient Program and 12-Step participation  - \"Homework\" recommendation: step one  - Next appointment scheduled:  11/30/2018  - Patient is in agreement with the above plan:  YES    Cely Looney R.N.  11/28/2018                                         "

## 2018-11-30 ENCOUNTER — HOSPITAL ENCOUNTER (OUTPATIENT)
Dept: BEHAVIORAL HEALTH | Facility: MEDICAL CENTER | Age: 68
End: 2018-11-30
Attending: PSYCHIATRY & NEUROLOGY
Payer: MEDICARE

## 2018-11-30 DIAGNOSIS — F10.21 ALCOHOL DEPENDENCE IN REMISSION (HCC): ICD-10-CM

## 2018-11-30 PROCEDURE — 90853 GROUP PSYCHOTHERAPY: CPT

## 2018-11-30 NOTE — BH THERAPY
Group Therapy Checklist  Attendance: Attended  Attendance Duration (min):  (60)  Number of Participants: 13  Program/Group: Intensive Outpatient Program  Topics Covered: Belief Systems  Participation: Active verbal participation, Attentive  Affect/Mood Range: Normal range, Flexible  Affect/Mood Display: Congruent w/content  Cognition: Alert, Oriented  Evidence of Imminent Suicide Risk: No  Evidence of imminent homicide risk: No  Therapeutic Interventions: Cognitive clarification, Values clarification  Progress Toward Treatment Goal: Mild improvement

## 2018-12-01 NOTE — BH THERAPY
Group Therapy Checklist  Attendance: Attended  Attendance Duration (min):  (90)  Number of Participants: 13  Program/Group: Intensive Outpatient Program  Topics Covered: Weekend planning  Participation: Limited verbal participation (Pt to go to first AA meeting over the weekend and stay focused on his sobriety. )  Affect/Mood Range: Normal range  Affect/Mood Display: Congruent w/content  Cognition: Oriented, Alert  Evidence of Imminent Suicide Risk: No  Evidence of imminent homicide risk: No  Therapeutic Interventions: Cognitive clarification, Emotion clarification  Progress Toward Treatment Goal: Moderate improvement

## 2018-12-03 ENCOUNTER — HOSPITAL ENCOUNTER (OUTPATIENT)
Dept: BEHAVIORAL HEALTH | Facility: MEDICAL CENTER | Age: 68
End: 2018-12-03
Attending: PSYCHIATRY & NEUROLOGY
Payer: MEDICARE

## 2018-12-03 DIAGNOSIS — F10.21 ALCOHOL DEPENDENCE IN REMISSION (HCC): ICD-10-CM

## 2018-12-03 PROCEDURE — 90853 GROUP PSYCHOTHERAPY: CPT

## 2018-12-03 NOTE — BH THERAPY
Group Therapy Checklist  Attendance: Attended  Attendance Duration (min):  (60)  Number of Participants: 13  Program/Group: Intensive Outpatient Program  Topics Covered: Addiction behaviors  Participation: Limited verbal participation, Attentive  Affect/Mood Range: Constricted  Affect/Mood Display: Congruent w/content  Cognition: Oriented, Alert  Evidence of Imminent Suicide Risk: No  Evidence of imminent homicide risk: No  Therapeutic Interventions: Psychoeducation re: (Comment), Emotion clarification  Progress Toward Treatment Goal: Moderate improvement

## 2018-12-03 NOTE — BH THERAPY
Group Therapy Checklist  Attendance: Attended  Attendance Duration (min):  (90)  Number of Participants: 13  Program/Group: Intensive Outpatient Program  Topics Covered:  (process group)  Participation: Active verbal participation, Attentive, Supportive to other group members, Open to feedback  Affect/Mood Range: Normal range, Flexible  Affect/Mood Display: Congruent w/content  Cognition: Alert, Oriented  Evidence of Imminent Suicide Risk: No  Evidence of imminent homicide risk: No  Therapeutic Interventions: Emotion clarification, Supportive psychotherapy  Progress Toward Treatment Goal: Moderate improvement  Patient actively participated in process group.  Addressed active unresolved emotional issues. Taught some emotional skills and techniques to assist with the emotional skill building that relates to their presenting issues and active treatment plan.

## 2018-12-04 ENCOUNTER — TELEPHONE (OUTPATIENT)
Dept: BEHAVIORAL HEALTH | Facility: MEDICAL CENTER | Age: 68
End: 2018-12-04

## 2018-12-04 NOTE — TELEPHONE ENCOUNTER
[facility]    Treatment Team Staffing    Patient Name: Boyd Askew Program: IOP Date: 12/4/2018     Attendees: Jeaneth Gaitan RN, [facility]; Yanely Esparza RN, [facility]; JOEL Kyle, [facility]; Cely Looney, [facility] and Sarina Goodman MD    Patient's Progress toward Goals Listed on the Treatment Plan: relapsed and back in treatment plan.     1. Client's Participation When in Attendance Was: Active in a Positive Way    2. Counselor's Evaluation of Client's Progress: Positive Movement    3. Patient is attending group and individual sessions and is progressing well toward the treatment goals: yes      YES NO   A. Relapse During Program [x]  []    B. Requires physician review []  [x]    C. Referral to program inappropriate []  [x]    D. Non compliance with Treatment Plan []  [x]    E. Early treatment termination (lack of attendance) []  [x]     []  []      Comments: He reports he is more tolerant and mindful of others. Encouraging AA     Treatment Plan Review: - Continue Intensive Outpatient Program and 12-Step participation  - Next appointment scheduled:  12/5/2018  - Patient is in agreement with the above plan:  YES

## 2018-12-05 ENCOUNTER — HOSPITAL ENCOUNTER (OUTPATIENT)
Dept: BEHAVIORAL HEALTH | Facility: MEDICAL CENTER | Age: 68
End: 2018-12-05
Attending: PSYCHIATRY & NEUROLOGY
Payer: MEDICARE

## 2018-12-05 DIAGNOSIS — F41.9 ANXIETY AND DEPRESSION: ICD-10-CM

## 2018-12-05 DIAGNOSIS — F32.A ANXIETY AND DEPRESSION: ICD-10-CM

## 2018-12-05 DIAGNOSIS — F10.21 ALCOHOL DEPENDENCE IN REMISSION (HCC): ICD-10-CM

## 2018-12-05 PROCEDURE — 90832 PSYTX W PT 30 MINUTES: CPT | Mod: XU

## 2018-12-05 PROCEDURE — 90853 GROUP PSYCHOTHERAPY: CPT

## 2018-12-05 NOTE — BH THERAPY
Group Therapy Checklist  Attendance: Attended  Attendance Duration (min):  (60)  Number of Participants: 10  Program/Group: Intensive Outpatient Program  Topics Covered: 12 Step orientation  Participation: Active verbal participation  Affect/Mood Range: Normal range, Flexible  Affect/Mood Display: Congruent w/content  Cognition: Alert, Oriented  Evidence of Imminent Suicide Risk: No  Evidence of imminent homicide risk: No  Therapeutic Interventions: Cognitive clarification, Relapse prevention  Progress Toward Treatment Goal: Mild improvement     none

## 2018-12-05 NOTE — BH THERAPY
Group Therapy Checklist  Attendance: Attended  Attendance Duration (min):  (90)  Number of Participants: 9  Program/Group: Intensive Outpatient Program  Topics Covered:  (Group Therapy)  Participation: No verbal participation, Attentive (Pt actively listened to peers i group talk about getting honest and surrenduring and wanting sobriety. )  Affect/Mood Range: Normal range  Affect/Mood Display: Congruent w/content  Cognition: Oriented, Alert  Evidence of Imminent Suicide Risk: No  Evidence of imminent homicide risk: No  Therapeutic Interventions: Cognitive clarification, Emotion clarification  Progress Toward Treatment Goal: Moderate improvement

## 2018-12-05 NOTE — CARE PLAN
Problem: Substance Induced Symptoms  Goal: Achieve stable functioning  Pt will abstain from all drugs of abuse while in program and apply recovery skills using 12 step model as evidenced by verbalizing his abstinence, and his strategies for managing triggers to use by IOP discharge.      Intervention: 12 Step guides   [facility]  Therapy Progress Note    Patient Name: Boyd Askew  Patient MRN: [id number]  Today's Date: 12/5/2018     Type of session:Individual psychotherapy  Length of session: 30 minutes  Persons in attendance:Patient    Subjective/New Info: individual session. Lamin has not done written assignment of step one. He states he has been distracted with word puzzles. Processed motivation and readiness to go forward. He identified two AA meetings to attend this week. Lamin takes the bus everywhere, snow on the ground today keeps him more isolated. He feels accomplished in getting here today as buses were running late. Wife and he are getting along. He is hoping to move some furniture into their home this weekend with help from others.     Objective/Observations:   Participation: Limited verbal participation and Attentive   Grooming: Casual and Neat   Cognition: Alert and Fully Oriented   Eye contact: Limited   Mood: Depressed   Affect: Flexible, Flat and Congruent with content   Thought process: Logical and Goal-directed   Speech: Rate within normal limits and Volume within normal limits   Other:     Diagnoses: alcohol use disorder, moderate, in remission x one week.    Current risk:   SUICIDE: Not applicable   Homicide: Not applicable   Self-harm: Not applicable   Relapse: Moderate   Other:    Safety Plan reviewed? Not Indicated   If evidence of imminent risk is present, intervention/plan:     Therapeutic Intervention(s): Develop/modify treatment plan, Self-care skills, Stressors assessed and Supportive psychotherapy    Treatment Goal(s)/Objective(s) addressed: 12 step and relapse  prevention.      Progress toward Treatment Goals: Return to baseline    Plan:  - Continue Intensive Outpatient Program and 12-Step participation  - Next appointment scheduled:  12/7/2018  - Patient is in agreement with the above plan:  YES    Cely Looney R.N.  12/5/2018

## 2018-12-07 ENCOUNTER — APPOINTMENT (OUTPATIENT)
Dept: BEHAVIORAL HEALTH | Facility: MEDICAL CENTER | Age: 68
End: 2018-12-07
Attending: PSYCHIATRY & NEUROLOGY
Payer: MEDICARE

## 2018-12-10 ENCOUNTER — HOSPITAL ENCOUNTER (OUTPATIENT)
Dept: BEHAVIORAL HEALTH | Facility: MEDICAL CENTER | Age: 68
End: 2018-12-10
Attending: PSYCHIATRY & NEUROLOGY
Payer: MEDICARE

## 2018-12-10 DIAGNOSIS — F10.20 ACUTE ALCOHOLISM (HCC): ICD-10-CM

## 2018-12-10 PROCEDURE — 90853 GROUP PSYCHOTHERAPY: CPT | Performed by: MARRIAGE & FAMILY THERAPIST

## 2018-12-10 NOTE — BH THERAPY
Group Therapy Checklist  Attendance: Attended  Attendance Duration (min):  (60)  Number of Participants: 10  Program/Group: Intensive Outpatient Program  Topics Covered: Steps 1/2/3  Participation: Limited verbal participation, Attentive  Affect/Mood Range: Normal range  Affect/Mood Display: Congruent w/content  Cognition: Oriented, Alert  Evidence of Imminent Suicide Risk: No  Evidence of imminent homicide risk: No  Therapeutic Interventions: Psychoeducation re: (Comment), Emotion clarification  Progress Toward Treatment Goal: Moderate improvement

## 2018-12-10 NOTE — BH THERAPY
Group Therapy Checklist  Attendance: Attended  Attendance Duration (min):  (90)  Program/Group: Intensive Outpatient Program  Topics Covered:  (process group)  Participation: Active verbal participation, Attentive, Supportive to other group members, Open to feedback  Affect/Mood Range: Normal range, Flexible  Affect/Mood Display: Congruent w/content  Cognition: Alert, Oriented  Evidence of Imminent Suicide Risk: No  Evidence of imminent homicide risk: No  Therapeutic Interventions: Emotion clarification, Supportive psychotherapy  Progress Toward Treatment Goal: Mild improvement  Patient actively participated in process group.  Addressed active unresolved emotional issues. Taught some emotional skills and techniques to assist with the emotional skill building that relates to their presenting issues and active treatment plan.  Receptive to peer support.

## 2018-12-12 ENCOUNTER — TELEPHONE (OUTPATIENT)
Dept: BEHAVIORAL HEALTH | Facility: MEDICAL CENTER | Age: 68
End: 2018-12-12

## 2018-12-12 ENCOUNTER — HOSPITAL ENCOUNTER (OUTPATIENT)
Dept: BEHAVIORAL HEALTH | Facility: MEDICAL CENTER | Age: 68
End: 2018-12-12
Attending: PSYCHIATRY & NEUROLOGY
Payer: MEDICARE

## 2018-12-12 DIAGNOSIS — F10.20 ACUTE ALCOHOLISM (HCC): ICD-10-CM

## 2018-12-12 PROCEDURE — 90853 GROUP PSYCHOTHERAPY: CPT | Performed by: MARRIAGE & FAMILY THERAPIST

## 2018-12-12 NOTE — BH THERAPY
Group Therapy Checklist  Attendance: Attended  Attendance Duration (min):  (90 min.)  Number of Participants: 8  Program/Group: Intensive Outpatient Program  Topics Covered:  (Group Therapy)  Participation: Active verbal participation, Attentive, Supportive to other group members (Pt stated that he has not been honest and has been lying and drinking. He knows that he needs to go IP now to get better and will take the steps to do so. )  Affect/Mood Range: Normal range  Affect/Mood Display: Congruent w/content, Sad  Cognition: Oriented, Alert  Evidence of Imminent Suicide Risk: No  Evidence of imminent homicide risk: No  Therapeutic Interventions: Cognitive clarification, Emotion clarification  Progress Toward Treatment Goal: Mild decline

## 2018-12-12 NOTE — BH THERAPY
Group Therapy Checklist  Attendance: Attended  Attendance Duration (min):  (60)  Number of Participants: 8  Program/Group: Intensive Outpatient Program  Topics Covered: Caretaking  Participation: Limited verbal participation, Attentive  Affect/Mood Range: Normal range, Flexible  Affect/Mood Display: Congruent w/content  Cognition: Alert, Oriented  Evidence of Imminent Suicide Risk: No  Evidence of imminent homicide risk: No  Therapeutic Interventions: Cognitive clarification, Interpersonal effectiveness skills  Progress Toward Treatment Goal: No change

## 2018-12-13 ENCOUNTER — TELEPHONE (OUTPATIENT)
Dept: BEHAVIORAL HEALTH | Facility: MEDICAL CENTER | Age: 68
End: 2018-12-13

## 2018-12-14 ENCOUNTER — APPOINTMENT (OUTPATIENT)
Dept: BEHAVIORAL HEALTH | Facility: MEDICAL CENTER | Age: 68
End: 2018-12-14
Attending: PSYCHIATRY & NEUROLOGY
Payer: MEDICARE

## 2018-12-14 ENCOUNTER — TELEPHONE (OUTPATIENT)
Dept: BEHAVIORAL HEALTH | Facility: MEDICAL CENTER | Age: 68
End: 2018-12-14

## 2018-12-14 NOTE — TELEPHONE ENCOUNTER
[facility]  TRANSFER/DISCHARGE SUMMARY FORM    HHPI / SCP: no Other Ins.: medicare     Patient Name: Boyd Askew  Admission Date: 18  Level of Care Attended:  Intens.OP : 1950  Transfer/Discharge Date: MRN: [id number]  14       SIGNIFICANT FINDINGS/CLINICAL IMPRESSION:   DSM Codes:   IOP    ICD10 Codes:  F10.20  Additional problems identified via assessment: unable to sustain abstinence, moving from weekly to apartment, bus for transportation    Treatment Components in Which Patient Participated (check all that apply):  Education group(s), 1:1 teaching/therapy, Group Therapy and 12-Step Group(s)    Summary of Course of Treatment: Active participation all education forums, process groups and individual counseling sessions. No written assignments completed.     Condition at Time of Transfer/Discharge: Case administratively closed due to requiring higher level of care.    [] Medications Reviewed with Copy to Patient    Referred to: [facility] for higher level of care to facilitate abstinence from alcohol and to care for co-occurring mental health needs.     Patient is in agreement with discharge plan: yes    Cely Looney R.N.

## 2018-12-17 ENCOUNTER — APPOINTMENT (OUTPATIENT)
Dept: BEHAVIORAL HEALTH | Facility: MEDICAL CENTER | Age: 68
End: 2018-12-17
Attending: PSYCHIATRY & NEUROLOGY
Payer: MEDICARE

## 2018-12-19 ENCOUNTER — APPOINTMENT (OUTPATIENT)
Dept: BEHAVIORAL HEALTH | Facility: MEDICAL CENTER | Age: 68
End: 2018-12-19
Attending: PSYCHIATRY & NEUROLOGY
Payer: MEDICARE

## 2018-12-19 ENCOUNTER — DOCUMENTATION (OUTPATIENT)
Dept: BEHAVIORAL HEALTH | Facility: CLINIC | Age: 68
End: 2018-12-19

## 2018-12-20 ENCOUNTER — APPOINTMENT (OUTPATIENT)
Dept: BEHAVIORAL HEALTH | Facility: MEDICAL CENTER | Age: 68
End: 2018-12-20
Attending: PSYCHIATRY & NEUROLOGY
Payer: MEDICARE

## 2018-12-20 NOTE — ADDENDUM NOTE
Encounter addended by: Cely Looeny R.N. on: 12/20/2018  9:45 AM<BR>    Actions taken: Care Plan problems brought forward, Care Plan progress modified, Care Plan modified

## 2018-12-21 ENCOUNTER — APPOINTMENT (OUTPATIENT)
Dept: BEHAVIORAL HEALTH | Facility: MEDICAL CENTER | Age: 68
End: 2018-12-21
Attending: PSYCHIATRY & NEUROLOGY
Payer: MEDICARE

## 2018-12-24 ENCOUNTER — APPOINTMENT (OUTPATIENT)
Dept: BEHAVIORAL HEALTH | Facility: MEDICAL CENTER | Age: 68
End: 2018-12-24
Attending: PSYCHIATRY & NEUROLOGY
Payer: MEDICARE

## 2018-12-27 ENCOUNTER — TELEPHONE (OUTPATIENT)
Dept: BEHAVIORAL HEALTH | Facility: MEDICAL CENTER | Age: 68
End: 2018-12-27

## 2019-01-02 ENCOUNTER — TELEPHONE (OUTPATIENT)
Dept: BEHAVIORAL HEALTH | Facility: MEDICAL CENTER | Age: 69
End: 2019-01-02

## 2019-01-14 ENCOUNTER — APPOINTMENT (OUTPATIENT)
Dept: BEHAVIORAL HEALTH | Facility: CLINIC | Age: 69
End: 2019-01-14
Payer: MEDICARE

## 2019-01-21 ENCOUNTER — OFFICE VISIT (OUTPATIENT)
Dept: BEHAVIORAL HEALTH | Facility: CLINIC | Age: 69
End: 2019-01-21
Payer: MEDICARE

## 2019-01-21 VITALS
HEART RATE: 69 BPM | SYSTOLIC BLOOD PRESSURE: 139 MMHG | WEIGHT: 237 LBS | HEIGHT: 69 IN | DIASTOLIC BLOOD PRESSURE: 74 MMHG | RESPIRATION RATE: 18 BRPM | BODY MASS INDEX: 35.1 KG/M2

## 2019-01-21 DIAGNOSIS — F20.9 SCHIZOPHRENIA, UNSPECIFIED TYPE (HCC): ICD-10-CM

## 2019-01-21 DIAGNOSIS — F10.10 ALCOHOL ABUSE: ICD-10-CM

## 2019-01-21 PROCEDURE — 99214 OFFICE O/P EST MOD 30 MIN: CPT | Performed by: NURSE PRACTITIONER

## 2019-01-22 NOTE — PROGRESS NOTES
"PSYCHIATRY FOLLOW-UP NOTE      Chief Complaint   Patient presents with   • Establish Care   • Medication Management   • Anxiety   • Addiction Problem       History Of Present Illness:  Boyd Askew is a 68 y.o. male with Schizophrenia comes in today for follow up, was last seen in this office in March 2018 by a prescriber.  Patient is not new to this clinic but is new to this provider    Patient states he has been on his current medication regimen for approximately 2 years and states \"it works really good for me.\"  However, today he is complaining of increased anxiety for the last few months.  He states his anxiety is constantly \"8 out of 10\".  He feels he is worrying excessively, is always restless or on edge, becomes easily fatigued, and is also often irritable.  He describes panic attacks that happen approximately 2-3 times a week where he experiences heart palpitations, shortness of breath, and headaches.  He feels his propranolol has helped control his anxiety in the past, but is no longer working like it once did.  He requests a medication adjustment to help with anxiety symptoms.    The patient is drinking heavily.  He is drinking approximately 2-3 quarts of beer or wine a day.  He has done this almost all his life.  He states when he wakes up in the morning, the first thing he thinks about is what he is going to drink.  He states he has been to [facility] for medical detox 5 times in the last few months.  He states he can never get sobriety to stick, and wonders if he even really wants that at this point.  He notes his alcoholism has caused marital problems over the years but justifies it stating \"she drinks to self she has no reason to get mad at me!\"    The patient also complains of mild depressive symptoms.  He has been feeling a depressed mood over the last few months.  He feels guilty and worthless.  He describes his concentration as \"flighty.\"  His appetite has gone up over " "the last year or so.  He describes mild psychomotor retardation.  He verbalizes sleep disturbance and attributes that to running out of trazodone.  He states he gets 8 hours when taking trazodone and 4 hours without it.  He adamantly denies suicidal thoughts or intent.  He also still struggles with symptoms of psychosis.  He verbalizes he hears auditory hallucinations of \"mumbles\".  This is been going on for \"years and years.\"  He also states for the last few years he has been followed by a baby in a stroller.  Wherever he goes, this figure is always following him.  He also verbalizes racing thoughts, pressured speech at times, and periods where he can go without sleep for a few days.    He denies a symptom cluster consistent with an eating disorder, PTSD, ADHD, or OCD.    Current Psychotropic Medications:  Sertraline 150 mg p.o. daily  Olanzapine 10 mg p.o. nightly  Trazodone 50 mg p.o. q. at bedtime as needed insomnia  Propranolol 10 mg p.o. twice daily    Past Psychotropic Medication Trials:  Patient is unable to recall.    Social History:   He has 2 daughters and one stepchild.  He has been  to his wife for 34 years.  They recently got an apartment 1-1/2 months ago, which he is very happy about.  He recently fell down the stairs however and obtained a large laceration on his shin due to this.  He notes his wife, too, has a drinking problem.  For fun, he likes to work on puzzles, go on walks, go to movies.      Substance Use:  Alcohol: He drinks 2-3 quarts of alcohol daily.  Nicotine: He is a current smoker.  He has been for approximately 50 years.  Illicit drugs: None recently.  Caffeine: He drinks 2 cups of coffee a day.    Medications:  Current Outpatient Prescriptions   Medication Sig Dispense Refill   • sertraline (ZOLOFT) 100 MG Tab Take 150 mg by mouth every day.     • atorvastatin (LIPITOR) 20 MG Tab Take 1 Tab by mouth every bedtime. 90 Tab 0   • propranolol (INDERAL) 10 MG Tab Take 1 Tab by mouth " "2 times a day. 60 Tab 5   • traZODone (DESYREL) 50 MG Tab TAKE ONE TABLET BY MOUTH AT BEDTIME 90 Tab 3   • olanzapine (ZYPREXA) 10 MG tablet Take 1 Tab by mouth every bedtime. 30 Tab 11   • ibuprofen (MOTRIN) 600 MG Tab TAKE ONE TABLET BY MOUTH EVERY 8 HOURS AS NEEDED FOR MODERATE PAIN. 90 Tab 3     No current facility-administered medications for this visit.        Physical Examination:  /74 (BP Location: Right arm)   Pulse 69   Resp 18   Ht 1.753 m (5' 9\")   Wt 107.5 kg (237 lb)   BMI 35.00 kg/m²     Review of Symptoms:  Constitutional: denies recent chills, fevers.  Positive for fatigue.  History of back pain and arthritis.  Neuro: denies recent weakness, numbness, or headaches.   HEENT: denies recent nasal congestion or sore throat.  Cardiovascular: denies recent chest pain, palpitations, or extremity edema.  History of a mitral valve disorder  Respiratory: denies recent shortness of breath, dyspnea, or cough.  GI: denies recent nausea, vomiting, or diarrhea.  History of GERD.  : denies recent urinary urgency frequency or urgency.  Musculoskeletal: tone is good with normal gait and station, broad range of motion, and good balance. No abnormal movements noted.   Skin: denies recent rash or skin lesions.   Endocrine: denies recent cold and heat intolerance, denies excessive thirst.   Hematologic: denies recent abnormal bleeding and bruising easily.  He has a history of a venous insufficiency problem.  Psychiatric: Positive for symptoms of depression, anxiety, psychosis, substance abuse.    Mental Status Examination:   Appearance: 68 y.o.  male, dressed in casual attire, grooming and hygiene appropriate, neat,  Behavior: in no apparent distress, anxious, good eye contact, restlessness  Participation: active verbal participation, engaged, resistant at times  Speech: regular rate, regular volume, regular rhythm, language appropriate  Mood: \"Anxious.\"  Affect: anxious and mood " congruent  Orientation: alert and oriented to person, place, situation, and time.  Attention/concentration: Fair.  Associations/Abstract reasoning:Adequate.   Thought Process: linear, logical, and goal-directed  Thought Content: denies passive/active suicidal or homicidal ideations, intent, or plan  Perception: evidence of hallucinations present, Both auditory and visual  Fund of knowledge and vocabulary: Adequate.  Memory: Recent memory adequate, Remote memory limited  Insight: Poor.  Judgment: Poor.    Medical Records/Labs/Diagnostic Tests:  No  records found within the last 3 years, recent relevant provider encounters reviewed, recent relevant labs in record reviewed    Strengths/Assets:  Patient strengths identified included resilience, evidence of effective treatment      Impression:  1. Schizophrenia, unspecified type (HCC)     2. Alcohol abuse     3.      Nicotine dependence    Plan:  1. Medication options, alternatives (including no medications) and medication risks/benefits/side effects were discussed in detail.   2. Increase sertraline to 200 mg p.o. daily for worsening anxiety issues and depressive symptoms.Discussed SSRIs' 4-6 week period to assess for efficacy. Discussed side effects including nausea/vomiting, abdominal discomfort/pain, diarrhea, headaches, drowsiness, sleep disturbances, initial transient worsening of anxiety, agitation, hypertension, fatigue, sexual dysfunction, etc.  3. Continue olanzapine 10 mg p.o. nightly for continued symptoms of psychosis and mood instability.  4. Continue trazodone 50 mg p.o. nightly as needed insomnia for continued issues with sleep.  5. Continue propranolol 10 mg p.o. twice daily for continued anxiety symptoms  6. Encouraged sobriety.  Explored the possibility of going to AA and getting a sponsor.  Explored the dangers of drinking heavily while on psychotropic medications.  Discussed local places he could go if ever wanting to medically detox.  7.   Will  refer to therapist at this clinic.  8.   The patient was counseled on the benefits of engaging in 30 minutes of aerobic physical exercise 3-5 times a week to the patient's ability to help with psychiatric symptoms, verbalized understanding.  9.   Educated on caffeine's correlation with anxiety.  Discussed the potential benefits of decreasing caffeine on current psychiatric symptoms, verbalized understanding.  10. Smoking cessation given.  11. The patient was advised to call or come in to the clinic if symptoms worsen or if any future questions/issues regarding their medications arise; the patient verbalized understanding and agreement.    12. The patient was educated to call 911, call the suicide hotline, or go to local ER if having thoughts of suicide or homicide; verbalized understanding.    Return to clinic in 1 month or sooner if symptoms worsen    The proposed treatment plan was discussed with the patient who was provided the opportunity to ask questions and make suggestions regarding alternative treatment. Patient verbalized understanding and expressed agreement with the plan.     Total face-to-face time: 40 minutes with more than 50% of face-to-face time spent in counseling and coordinating care as stated in the above plan.     YOUNG Henley.P.R.N.  01/21/19    This note was created using voice recognition software (Dragon). The accuracy of the dictation is limited by the abilities of the software. I have reviewed the note prior to signing, however some errors in grammar and context are still possible. If you have any questions related to this note please do not hesitate to contact our office.

## 2019-01-28 ENCOUNTER — TELEPHONE (OUTPATIENT)
Dept: BEHAVIORAL HEALTH | Facility: CLINIC | Age: 69
End: 2019-01-28

## 2019-01-28 DIAGNOSIS — F51.01 PRIMARY INSOMNIA: ICD-10-CM

## 2019-01-28 RX ORDER — OLANZAPINE 10 MG/1
10 TABLET ORAL
Qty: 90 TAB | Refills: 3 | Status: SHIPPED | OUTPATIENT
Start: 2019-01-28 | End: 2019-04-23

## 2019-01-28 RX ORDER — TRAZODONE HYDROCHLORIDE 50 MG/1
TABLET ORAL
Qty: 90 TAB | Refills: 3 | Status: ON HOLD | OUTPATIENT
Start: 2019-01-28 | End: 2021-02-08

## 2019-01-28 NOTE — TELEPHONE ENCOUNTER
Medication refills ordered.    ----- Message from Maria Fernanda Greenberg, Med Ass't sent at 1/24/2019  3:06 PM PST -----  Regarding: Rx Refill  Pt called and LM stating he was supposed to get a refill on his trazodone and his olanzipine but I dont see any refills made in his chart.

## 2019-02-08 ENCOUNTER — OFFICE VISIT (OUTPATIENT)
Dept: BEHAVIORAL HEALTH | Facility: CLINIC | Age: 69
End: 2019-02-08
Payer: MEDICARE

## 2019-02-08 DIAGNOSIS — F10.10 ALCOHOL ABUSE: ICD-10-CM

## 2019-02-08 DIAGNOSIS — F25.1 SCHIZOAFFECTIVE DISORDER, DEPRESSIVE TYPE (HCC): ICD-10-CM

## 2019-02-08 PROCEDURE — 90834 PSYTX W PT 45 MINUTES: CPT | Performed by: PSYCHOLOGIST

## 2019-02-08 NOTE — BH THERAPY
[facility]  Therapy Progress Note    Patient Name: Boyd Askew  Patient MRN: [id number]  Today's Date: 2/8/2019     Type of session:Individual psychotherapy  Length of session: 45 minutes  Persons in attendance:Patient    Subjective/New Info:   Pt is a 68 year old white male referred by [facility] ([facility]) as a step down of care from In-patient. Pt was in [facility] for 4 nights and discharged on 10/27/18. He was prescribed Campral 333mg to help with his cravings for alcohol but he had not filled this yet and has continued to consume alcohol until 2/14/19. Patient reported that drinking alcohol and being off of his medication caused him to become depressed and suicidal. Patient has a pattern of feeling better and going off of his medication as he just did three months in a row in Aug., Sept., and October of this year. Pt reported some paranoia that he feels like he's being followed everywhere. He sometimes hears voices that he describes as mumbling crowd talk. He has visual hallucinations weekly and sometimes this frightens him. Hallucinations are diminished but not resolved by his medication. Patient continues to be depressed and experiences mild symptoms of his schizoaffective disorder. Talked with patient about importance of cooperation with his medications and keeping his stress level at a minimum. Patient reported that the people who live in the apartment below him are recording him and his wife and constantly yelling at them to be quiet (Probably auditory hallucinations). Will see patient next week.     Patient did not present in acute distress. Patient was appropriately groomed and cooperative. Patient was alert and oriented to person, place, and time. Eye contact was appropriate. No abnormalities in attention or concentration were noted. No abnormalities of movement present; psychomotor activity was normal. Speech was fluent and regular in rhythm, rate, volume, and  tone. Thought processes were linear, logical, and goal-directed. There was no evidence of thought disorder, although he did report visual and auditory hallucinations. Long and short term memory appeared to be intact. Insight, judgment, and impulse control were deemed to be within fair. Reported mood was fairly positive. Affect was appropriate and congruent with thought content and conversation. Patient denied current suicidal and homicidal ideation in plan, intent, and preparatory behavior.    Objective/Observations:   Participation: Active verbal participation, Attentive, Engaged and Open to feedback   Grooming: Casual and Neat   Cognition: Alert and Fully Oriented   Eye contact: Good   Mood: Depressed   Affect: Blunted   Thought process: Logical and Goal-directed   Speech: Rate within normal limits and Volume within normal limits   Other:     Diagnoses:   1. Schizoaffective disorder, depressive type (HCC)    2. Alcohol abuse         Current risk:   SUICIDE: Moderate   Homicide: Low   Self-harm: Low   Relapse: Moderate   Other:    Safety Plan reviewed? Yes   If evidence of imminent risk is present, intervention/plan:     Therapeutic Intervention(s): Cognitive modification, Distress tolerance skills, Establish rapport, Leisure and recreation skills, Stressors assessed and Supportive psychotherapy    Treatment Goal(s)/Objective(s) addressed:   Anxiety:  Objective A: Patient will learn one effective technique for dealing with anxiety each week, and utilize it effectively when feeling anxious.  Objective B: Patient will express an increase in positive statements by making at least five positive statements per day about self or current circumstances.  Objective C: Patient will increase activity level by participating in at least two hours, three times per week in a leisure or social activity.     Stress:  Objective A: Patient will learn effective coping skills for dealing with stress by developing and utilizing at least  one effective technique per week to deal with stress and frustration.  Objective B: Patient will be able to identify healthy behaviors and develop positive social interactions by spending at least two hours twice each week in an activity that is appropriate  Objective C: Patient will communicate effectively and assertively by expressing appropriate assertive thoughts in an attempt to reduce frustrations and/or aggression when feeling treated unfairly.    Understanding mental illness:  Objective A: Patient will gain an improved understanding of symptoms, risks, and importance of treatment for dealing with mental illness by cooperating with treatment recommendations.     Progress toward Treatment Goals: No change    Plan:  - Continue Individual therapy    Don Corea, Ph.D.  2/8/2019

## 2019-02-11 ENCOUNTER — TELEPHONE (OUTPATIENT)
Dept: BEHAVIORAL HEALTH | Facility: CLINIC | Age: 69
End: 2019-02-11

## 2019-02-11 DIAGNOSIS — F41.9 ANXIETY: ICD-10-CM

## 2019-02-11 RX ORDER — SERTRALINE HYDROCHLORIDE 100 MG/1
200 TABLET, FILM COATED ORAL DAILY
Qty: 60 TAB | Refills: 1 | Status: SHIPPED | OUTPATIENT
Start: 2019-02-11 | End: 2019-04-23 | Stop reason: SDUPTHER

## 2019-02-11 RX ORDER — SERTRALINE HYDROCHLORIDE 100 MG/1
200 TABLET, FILM COATED ORAL DAILY
Qty: 1 TAB | Refills: 0 | Status: SHIPPED | OUTPATIENT
Start: 2019-02-11 | End: 2019-02-11 | Stop reason: SDUPTHER

## 2019-02-12 RX ORDER — PROPRANOLOL HYDROCHLORIDE 10 MG/1
10 TABLET ORAL 2 TIMES DAILY
Qty: 60 TAB | Refills: 5 | Status: CANCELLED | OUTPATIENT
Start: 2019-02-12

## 2019-02-13 ENCOUNTER — TELEPHONE (OUTPATIENT)
Dept: BEHAVIORAL HEALTH | Facility: CLINIC | Age: 69
End: 2019-02-13

## 2019-02-13 ENCOUNTER — APPOINTMENT (OUTPATIENT)
Dept: BEHAVIORAL HEALTH | Facility: CLINIC | Age: 69
End: 2019-02-13
Payer: MEDICARE

## 2019-02-13 RX ORDER — PROPRANOLOL HYDROCHLORIDE 10 MG/1
10 TABLET ORAL 2 TIMES DAILY
Qty: 60 TAB | Refills: 2 | Status: SHIPPED | OUTPATIENT
Start: 2019-02-13 | End: 2019-06-04 | Stop reason: SDUPTHER

## 2019-02-15 ENCOUNTER — TELEPHONE (OUTPATIENT)
Dept: BEHAVIORAL HEALTH | Facility: CLINIC | Age: 69
End: 2019-02-15

## 2019-02-15 NOTE — TELEPHONE ENCOUNTER
pts wife called with concerns regarding medications that were sent to the pharmacy and old medications they had from previous providers. I went over all current medications/dosages/directions with pts wife and advised her to only take medications prescribed from [facility] and to disregard any old medications from previous psychiatrists. Pts wife understood directions and was gratefull.

## 2019-02-25 ENCOUNTER — OFFICE VISIT (OUTPATIENT)
Dept: BEHAVIORAL HEALTH | Facility: CLINIC | Age: 69
End: 2019-02-25
Payer: MEDICARE

## 2019-02-25 VITALS
SYSTOLIC BLOOD PRESSURE: 122 MMHG | DIASTOLIC BLOOD PRESSURE: 85 MMHG | HEART RATE: 69 BPM | RESPIRATION RATE: 16 BRPM | BODY MASS INDEX: 33.92 KG/M2 | WEIGHT: 229 LBS | HEIGHT: 69 IN

## 2019-02-25 DIAGNOSIS — F10.10 ALCOHOL ABUSE: ICD-10-CM

## 2019-02-25 DIAGNOSIS — F25.1 SCHIZOAFFECTIVE DISORDER, DEPRESSIVE TYPE (HCC): ICD-10-CM

## 2019-02-25 PROBLEM — F17.210 DEPENDENCE ON NICOTINE FROM CIGARETTES: Status: ACTIVE | Noted: 2017-03-03

## 2019-02-25 PROCEDURE — 99214 OFFICE O/P EST MOD 30 MIN: CPT | Performed by: NURSE PRACTITIONER

## 2019-02-25 ASSESSMENT — PATIENT HEALTH QUESTIONNAIRE - PHQ9
SUM OF ALL RESPONSES TO PHQ QUESTIONS 1-9: 11
5. POOR APPETITE OR OVEREATING: 1 - SEVERAL DAYS
CLINICAL INTERPRETATION OF PHQ2 SCORE: 3

## 2019-02-25 NOTE — PROGRESS NOTES
"PSYCHIATRY FOLLOW-UP NOTE      Chief Complaint   Patient presents with   • Follow-Up   • Medication Management   • Depression   • Hallucinations   • Addiction Problem     \"Things are not too great.\"    History Of Present Illness:  Boyd Askew is a 68 y.o. male with psychosis and depression comes in today for follow up, was last seen in this office by this provider on 1.21.19.    The patient reports \"things are not too great.\"  He notes increased stressors of marital issues and problems with his neighbors have increased his drinking since his last visit with this provider.  He is now drinking 4+ quarts of beer or wine a day regularly.  This upsets him as his 35th wedding anniversary is next weekend and he planned to take his wife out to dinner.  However, he states that when he left for his appointment today she stated \"You are not welcome back in his house until you try to quit drinking and fix yourself.\"  His wife called to talk to this provider before his appointment and verbalized the same.  The patient states he just drank a quart of beer before this appointment and he has alcohol on his breath.  He has not attended an AA meeting, although this is \"on my to-do list.\"  He is questioning if he needs to go back to [facility] for detox, as he has been there several times in the last few months.    He states that besides his drinking, his medications are working \"alright.\"  He has been feeling depressed, but he attributes that to marital and other problems with his neighbors.  He feels \"under scrutiny\" every day by his wife and feels as though he is never good enough, this makes him irritable.  His concentration remains stable, and his appetite has increased.  He is staying in bed most of the time, without energy or motivation to get out of bed, often 12-18 hours a day.  His anxiety remains high and notes he has had a few panic attacks recently, but propanolol helps.  He denies suicidal or " "homicidal ideations, passive or active.    His symptoms of psychosis still remain.  He admits to still hearing voices, often mumbles that are not audible, denies commands.  He still often experiences visual hallucinations of a baby in a stroller.  He verbalizes paranoia, thinking that the neighbors have \"bugged\" his apartment with listening devices and are plotting against him.  He admits to constant racing thoughts.    Current Psychotropic Medications:  Sertraline 200 mg p.o. daily  Olanzapine 10 mg p.o. nightly  Trazodone 50 mg p.o. q. at bedtime as needed insomnia  Propranolol 10 mg p.o. twice daily     Past Psychotropic Medication Trials:  Patient is unable to recall.    Social History:   The patient has been isolating.  He has been experiencing marital problems.  He feels as they are always \"in each other spaces\" due to living in a small apartment.  However, they continue to go on walks with each other and gone on a few in the last few days.  He has not gone to any AA meetings.  He continues to attend therapy with Don Corea in this clinic.    Substance Use:  Alcohol: He drinks 4+ quarts of alcohol daily.  Nicotine: He is a current smoker.  He has been for approximately 50 years.  Illicit drugs: Smokes marijuana occasionally.  Caffeine: He drinks 2 cups of coffee a day.    Medications:  Current Outpatient Prescriptions   Medication Sig Dispense Refill   • propranolol (INDERAL) 10 MG Tab Take 1 Tab by mouth 2 times a day. 60 Tab 2   • sertraline (ZOLOFT) 100 MG Tab Take 2 Tabs by mouth every day for 90 days. 60 Tab 1   • olanzapine (ZYPREXA) 10 MG tablet Take 1 Tab by mouth every bedtime. 90 Tab 3   • traZODone (DESYREL) 50 MG Tab TAKE ONE TABLET BY MOUTH AT BEDTIME 90 Tab 3   • sertraline (ZOLOFT) 100 MG Tab Take 150 mg by mouth every day.     • atorvastatin (LIPITOR) 20 MG Tab Take 1 Tab by mouth every bedtime. 90 Tab 0   • ibuprofen (MOTRIN) 600 MG Tab TAKE ONE TABLET BY MOUTH EVERY 8 HOURS AS NEEDED FOR " "MODERATE PAIN. 90 Tab 3     No current facility-administered medications for this visit.        Depression Screening (PHQ-9 Score) Today: 11  Depression Screen (PHQ-2/PHQ-9) 10/25/2017 11/28/2017 5/22/2018   PHQ-2 Total Score - - -   PHQ-2 Total Score 0 0 1   PHQ-9 Total Score - - 2     Interpretation of PHQ-9 Total Score   Score Severity   1-4 No Depression   5-9 Mild Depression   10-14 Moderate Depression   15-19 Moderately Severe Depression   20-27 Severe Depression    Physical Examination:  Ht 5'9\"  Wt 229  122/85, 69, 16    Review of Symptoms:  Constitutional: denies recent chills, fevers.  Positive for fatigue.  History of back pain and arthritis.  Neuro: denies recent weakness, numbness, or headaches.   HEENT: denies recent nasal congestion or sore throat.  Cardiovascular: denies recent chest pain, palpitations, or extremity edema.  History of a mitral valve disorder  Respiratory: denies recent shortness of breath, dyspnea, or cough.  GI: denies recent nausea, vomiting, or diarrhea.  History of GERD.  : denies recent urinary urgency frequency or urgency.  Musculoskeletal: tone is good with normal gait and station, broad range of motion, and good balance. No abnormal movements noted.   Skin: denies recent rash or skin lesions.   Endocrine: denies recent cold and heat intolerance, denies excessive thirst.   Hematologic: denies recent abnormal bleeding and bruising easily.  He has a history of a venous insufficiency problem.  Psychiatric: Positive for symptoms of depression, anxiety, psychosis, and alcohol abuse.     Mental Status Examination:   Appearance: 68 y.o.  male, dressed in casual attire, overweight, stocking cap and scarf, grooming and hygiene appropriate, alcohol on breath  Behavior: anxious, good eye contact  Participation: active verbal participation, engaged, defensive at times.  Speech: regular rate, regular volume, regular rhythm, language appropriate  Mood: \"Been better.\"  Affect: " anxious and mood congruent  Orientation: alert and oriented to person, place, situation, and time.  Attention/concentration: Fair.  Associations/Abstract reasoning:Adequate.   Thought Process: linear, logical, and goal-directed  Thought Content: denies passive/active suicidal or homicidal ideations, intent, or plan  Perception: evidence of hallucinations present, both auditory and visual, paranoia present  Fund of knowledge and vocabulary: Adequate.  Memory: Recent memory adequate, Remote memory limited  Insight: Poor.  Judgment: Poor.    Medical Records/Labs/Diagnostic Tests:   records reviewed, recent relevant provider encounters reviewed, recent relevant labs in record reviewed    Strengths/Assets:  Patient strengths identified included resilience, effectively addressed past stressors/challenges      Impression:  1. Schizoaffective disorder, depressive type (HCC)     2. Alcohol abuse     3.      Nicotine dependence    Plan:  1. Explored getting help for increased alcohol use.  Patient expressed willingness to go to [facility] for detox as he has done in the past. Called this facility along with the patient, they state they have a bed for him, notified them that he was on his way over via bus. Encouraged sobriety.  Explored the benefits of going to  and getting a sponsor after discharge.  Explored the dangers of continued drinking while on psychotropic medications.    2. Medication options, alternatives (including no medications) and medication risks/benefits/side effects were discussed in detail.  Discussed how we will not change his medications at this time as they will likely change during his inpatient hospitalization.  3. Continue sertraline 200mg po daily for continued depressive symptoms.   4. Continue olanzapine 10 mg p.o. nightly for continued symptoms of psychosis and mood instability.  5. Continue trazodone 50 mg p.o. nightly as needed insomnia for continued issues with sleep.  6. Continue  propranolol 10 mg p.o. twice daily for continued anxiety symptoms  7. Pt is to continue therapy at this clinic.  8. Encouraged socialization - through AA or volunteering.   9. Counseled on smoking cessation.  10. Called patient's wife and informed her that patient will be going to [facility] per patient's request.  JAYDE in chart.  11. The patient was advised to call, message provider on [facility], or come in to the clinic if symptoms worsen or if any future questions/issues regarding their medications arise; the patient verbalized understanding and agreement.    12. The patient was educated to call 911, call the suicide hotline, or go to local ER if having thoughts of suicide or homicide; verbalized understanding.    Return to clinic in 2 weeks or sooner (when released from [facility])     The proposed treatment plan was discussed with the patient who was provided the opportunity to ask questions and make suggestions regarding alternative treatment. Patient verbalized understanding and expressed agreement with the plan.     Total face-to-face time: 30 minutes with more than 50% of face-to-face time spent in counseling and coordinating care as stated in the above plan.     KELLY HenleyPMaliR.N.  02/25/19    This note was created using voice recognition software (Dragon). The accuracy of the dictation is limited by the abilities of the software. I have reviewed the note prior to signing, however some errors in grammar and context are still possible. If you have any questions related to this note please do not hesitate to contact our office.

## 2019-03-06 ENCOUNTER — DOCUMENTATION (OUTPATIENT)
Dept: BEHAVIORAL HEALTH | Facility: CLINIC | Age: 69
End: 2019-03-06

## 2019-03-11 ENCOUNTER — APPOINTMENT (OUTPATIENT)
Dept: BEHAVIORAL HEALTH | Facility: CLINIC | Age: 69
End: 2019-03-11
Payer: MEDICARE

## 2019-03-21 DIAGNOSIS — E78.5 DYSLIPIDEMIA: ICD-10-CM

## 2019-03-25 RX ORDER — ATORVASTATIN CALCIUM 20 MG/1
20 TABLET, FILM COATED ORAL
Qty: 90 TAB | Refills: 0 | OUTPATIENT
Start: 2019-03-25

## 2019-04-23 ENCOUNTER — OFFICE VISIT (OUTPATIENT)
Dept: BEHAVIORAL HEALTH | Facility: CLINIC | Age: 69
End: 2019-04-23
Payer: MEDICARE

## 2019-04-23 VITALS
SYSTOLIC BLOOD PRESSURE: 138 MMHG | HEART RATE: 75 BPM | WEIGHT: 233 LBS | BODY MASS INDEX: 34.51 KG/M2 | HEIGHT: 69 IN | DIASTOLIC BLOOD PRESSURE: 94 MMHG | RESPIRATION RATE: 18 BRPM

## 2019-04-23 DIAGNOSIS — F41.9 ANXIETY: ICD-10-CM

## 2019-04-23 DIAGNOSIS — F10.10 ALCOHOL ABUSE: ICD-10-CM

## 2019-04-23 DIAGNOSIS — F25.1 SCHIZOAFFECTIVE DISORDER, DEPRESSIVE TYPE (HCC): ICD-10-CM

## 2019-04-23 PROCEDURE — 99214 OFFICE O/P EST MOD 30 MIN: CPT | Performed by: NURSE PRACTITIONER

## 2019-04-23 RX ORDER — OLANZAPINE 15 MG/1
15 TABLET ORAL
Qty: 30 TAB | Refills: 1 | Status: SHIPPED | OUTPATIENT
Start: 2019-04-23 | End: 2019-06-08 | Stop reason: SDUPTHER

## 2019-04-23 RX ORDER — OLANZAPINE 15 MG/1
15 TABLET ORAL
COMMUNITY
Start: 2019-03-13 | End: 2019-04-23 | Stop reason: SDUPTHER

## 2019-04-23 RX ORDER — SERTRALINE HYDROCHLORIDE 100 MG/1
200 TABLET, FILM COATED ORAL DAILY
Qty: 60 TAB | Refills: 1 | Status: SHIPPED | OUTPATIENT
Start: 2019-04-23 | End: 2019-06-22

## 2019-04-23 ASSESSMENT — PATIENT HEALTH QUESTIONNAIRE - PHQ9: CLINICAL INTERPRETATION OF PHQ2 SCORE: 0

## 2019-04-23 NOTE — PROGRESS NOTES
"PSYCHIATRY FOLLOW-UP NOTE    Chief Complaint:    \"I'm doing pretty good.\"    History Of Present Illness:  Boyd Askew is a 68 y.o. male with psychosis and depression comes in today for follow up, was last seen in this office by this provider on 2.25.19.     The patient states he went to [facility] after our last appointment for detox.  He has relapsed \"a few times\" since then but states he has not been drinking for the past 2 weeks.  He has not been going to AA, and has just stayed sober by staying busy.  He has been taking walks, coloring, and playing board games with his wife when she is home.     His mood has been \"good\" lately. He has been eating and sleeping well. He denies recent AH/VH/paranoia.  His olanzapine was increased to 15mg qHS at [facility], which has helped alleviate these symptoms. His anxiety has been fluctuating, but overall pretty under control, he denies side effects. He has not been going to therapy lately, but plans to make an appointment today.  He denies suicidal thoughts, passive or active.     Current Psychotropic Medications:  Sertraline 200 mg p.o. daily  Olanzapine 15 mg p.o. nightly  Trazodone 50 mg p.o. q. at bedtime as needed insomnia  Propranolol 10 mg p.o. twice daily     Past Psychotropic Medication Trials:  Patient is unable to recall.     Social History:   His wife has been in the hospital for the past week for psychosis. He misses her and is hopeful that she can come home today. He feels their marital problems are going away and have been getting along well lately. They continue to enjoy walks with each other and going to the movies together.     Substance Use:  Alcohol: Recent sobriety, 2 weeks clean  Nicotine: He is a current smoker.  He has been for approximately 50 years.  Illicit drugs: Smokes marijuana occasionally.  Caffeine: He drinks 2 cups of coffee a day.     Medications:  Current Outpatient Prescriptions   Medication Sig Dispense Refill   • propranolol " "(INDERAL) 10 MG Tab Take 1 Tab by mouth 2 times a day. 60 Tab 2   • sertraline (ZOLOFT) 100 MG Tab Take 2 Tabs by mouth every day for 90 days. 60 Tab 1   • olanzapine (ZYPREXA) 10 MG tablet Take 1 Tab by mouth every bedtime. 90 Tab 3   • traZODone (DESYREL) 50 MG Tab TAKE ONE TABLET BY MOUTH AT BEDTIME 90 Tab 3   • sertraline (ZOLOFT) 100 MG Tab Take 150 mg by mouth every day.     • atorvastatin (LIPITOR) 20 MG Tab Take 1 Tab by mouth every bedtime. 90 Tab 0   • ibuprofen (MOTRIN) 600 MG Tab TAKE ONE TABLET BY MOUTH EVERY 8 HOURS AS NEEDED FOR MODERATE PAIN. 90 Tab 3     No current facility-administered medications for this visit.      Depression Screening (PHQ-9 Score) today: 0  Depression Screen (PHQ-2/PHQ-9) 11/28/2017 5/22/2018 2/25/2019   PHQ-2 Total Score - - -   PHQ-2 Total Score 0 1 3   PHQ-9 Total Score - 2 11     Interpretation of PHQ-9 Total Score   Score Severity   1-4 No Depression   5-9 Mild Depression   10-14 Moderate Depression   15-19 Moderately Severe Depression   20-27 Severe Depression    Physical Examination:  138/94, 75, 18, 233lbs, 5'9\"     Review of Symptoms:  Constitutional: denies recent chills, fevers.   Neuro: denies recent weakness, numbness, or headaches.   HEENT: denies recent nasal congestion or sore throat.  Cardiovascular:  History of a mitral valve disorder  Respiratory: long-term smoker  GI: History of GERD.  : denies recent urinary urgency frequency or urgency.  Musculoskeletal: tone is good with normal gait and station, broad range of motion, and good balance. No abnormal movements noted.  History of back pain and arthritis.  Skin: denies recent rash or skin lesions.   Endocrine: denies recent cold and heat intolerance, denies excessive thirst.   Hematologic:  He has a history of a venous insufficiency problem.  Psychiatric: Positive for symptoms of depression, anxiety, psychosis, and alcohol misuse.     Mental Status Examination:   Appearance: 68 y.o.  male, dressed " "in casual attire, overweight, grooming and hygiene appropriate  Behavior: calm and cooperative, good eye contact  Participation: active verbal participation, engaged  Speech: regular rate, regular volume, regular rhythm, language appropriate  Mood: \"okay.\"  Affect: euthymic and mood congruent  Orientation: alert and oriented to person, place, situation, and time.  Attention/concentration: Fair.  Associations/Abstract reasoning:Adequate.   Thought Process: linear, logical, and goal-directed  Thought Content: denies passive/active suicidal or homicidal ideations, intent, or plan  Perception: denies psychosis today  Fund of knowledge and vocabulary: Adequate.  Memory: Recent memory adequate, Remote memory limited  Insight: Poor.  Judgment: Poor.     Medical Records/Labs/Diagnostic Tests:   records reviewed, recent relevant provider encounters reviewed, recent relevant labs in record reviewed     Strengths/Assets:  Patient strengths identified included resilience, effectively addressed past stressors/challenges                            Impression:  1. Schizoaffective disorder, depressive type (HCC)      2. Alcohol abuse, in early remission        Plan:  1. Medication options, alternatives (including no medications) and medication risks/benefits/side effects were discussed in detail.    2. Continue sertraline 200mg po daily for continued depressive/anxiety symptoms.   3. Continue olanzapine 15 mg p.o. nightly for psychosis and mood instability.  4. Continue trazodone 50 mg p.o. nightly as needed insomnia for continued issues with sleep.  5. Continue propranolol 10 mg p.o. twice daily for continued anxiety symptoms  6. Encouraged to continue therapy at this clinic.  7. Encouraged socialization, encouraged AA.  8. Counseled on smoking cessation.  9. The patient was advised to call, message provider on MyChart, or come in to the clinic if symptoms worsen or if any future questions/issues regarding their medications " arise; the patient verbalized understanding and agreement.   10. The patient was educated to call 911, call the suicide hotline, or go to local ER if having thoughts of suicide or homicide; verbalized understanding.    Return to clinic in 1 month (per patient request) or sooner if symptoms worsen    The proposed treatment plan was discussed with the patient who was provided the opportunity to ask questions and make suggestions regarding alternative treatment. Patient verbalized understanding and expressed agreement with the plan.     Total face-to-face time: 20 minutes with more than 50% of face-to-face time spent in counseling and coordinating care as stated in the above plan.     Trenton Murrieta A.P.R.N.  04/23/19    This note was created using voice recognition software (Dragon). The accuracy of the dictation is limited by the abilities of the software. I have reviewed the note prior to signing, however some errors in grammar and context are still possible. If you have any questions related to this note please do not hesitate to contact our office.

## 2019-05-20 NOTE — PROGRESS NOTES
"PSYCHIATRY FOLLOW-UP NOTE    Chief Complaint:    \"***\"    History Of Present Illness:  Boyd Askew is a 68 y.o. male with psychosis and depression comes in today for follow up, was last seen in this office by this provider on 4.23.19.     The patient states he went to [facility] after our last appointment for detox.  He has relapsed \"a few times\" since then but states he has not been drinking for the past 2 weeks.  He has not been going to , and has just stayed sober by staying busy.  He has been taking walks, coloring, and playing board games with his wife when she is home.      His mood has been \"good\" lately. He has been eating and sleeping well. He denies recent AH/VH/paranoia.  His olanzapine was increased to 15mg qHS at [facility], which has helped alleviate these symptoms. His anxiety has been fluctuating, but overall pretty under control, he denies side effects. He has not been going to therapy lately, but plans to make an appointment today.  He denies suicidal thoughts, passive or active.      Current Psychotropic Medications:  Sertraline 200 mg p.o. daily  Olanzapine 15 mg p.o. nightly  Trazodone 50 mg p.o. q. at bedtime as needed insomnia  Propranolol 10 mg p.o. twice daily     Past Psychotropic Medication Trials:  Patient is unable to recall.     Social History:   His wife has been in the hospital for the past week for psychosis. He misses her and is hopeful that she can come home today. He feels their marital problems are going away and have been getting along well lately. They continue to enjoy walks with each other and going to the movies together.     Substance Use:  Alcohol: Recent sobriety, 2 weeks clean  Nicotine: He is a current smoker.  He has been for approximately 50 years.  Illicit drugs: Smokes marijuana occasionally.  Caffeine: He drinks 2 cups of coffee a day.    Medications:  Current Outpatient Prescriptions   Medication Sig Dispense Refill   • sertraline (ZOLOFT) 100 MG Tab " "Take 2 Tabs by mouth every day for 60 days. 60 Tab 1   • olanzapine (ZYPREXA) 15 MG tablet Take 1 Tab by mouth every bedtime. 30 Tab 1   • propranolol (INDERAL) 10 MG Tab Take 1 Tab by mouth 2 times a day. 60 Tab 2   • traZODone (DESYREL) 50 MG Tab TAKE ONE TABLET BY MOUTH AT BEDTIME 90 Tab 3   • atorvastatin (LIPITOR) 20 MG Tab Take 1 Tab by mouth every bedtime. 90 Tab 0   • ibuprofen (MOTRIN) 600 MG Tab TAKE ONE TABLET BY MOUTH EVERY 8 HOURS AS NEEDED FOR MODERATE PAIN. 90 Tab 3     No current facility-administered medications for this visit.      Depression Screening (PHQ-9 Score) Today: ***  Depression Screen (PHQ-2/PHQ-9) 5/22/2018 2/25/2019 4/23/2019   PHQ-2 Total Score - - -   PHQ-2 Total Score 1 3 0   PHQ-9 Total Score 2 11 -     Interpretation of PHQ-9 Total Score   Score Severity   1-4 No Depression   5-9 Mild Depression   10-14 Moderate Depression   15-19 Moderately Severe Depression   20-27 Severe Depression    Physical Examination:  138/94, 75, 18, 233lbs, 5'9\"  ***     Review of Symptoms:  Constitutional: denies recent chills, fevers.   Neuro: denies recent weakness, numbness, or headaches.   HEENT: denies recent nasal congestion or sore throat.  Cardiovascular:  History of a mitral valve disorder  Respiratory: long-term smoker  GI: History of GERD.  : denies recent urinary urgency frequency or urgency.  Musculoskeletal: tone is good with normal gait and station, broad range of motion, and good balance. No abnormal movements noted.  History of back pain and arthritis.  Skin: denies recent rash or skin lesions.   Endocrine: denies recent cold and heat intolerance, denies excessive thirst.   Hematologic:  He has a history of a venous insufficiency problem.  Psychiatric: Positive for symptoms of depression, anxiety, psychosis, and alcohol misuse.     Mental Status Examination:   Appearance: 68 y.o.  male, dressed in casual attire, overweight, grooming and hygiene appropriate  Behavior: calm and " "cooperative, good eye contact  Participation: active verbal participation, engaged  Speech: regular rate, regular volume, regular rhythm, language appropriate  Mood: \"okay.\"  Affect: euthymic and mood congruent  Orientation: alert and oriented to person, place, situation, and time.  Attention/concentration: Fair.  Associations/Abstract reasoning:Adequate.   Thought Process: linear, logical, and goal-directed  Thought Content: denies passive/active suicidal or homicidal ideations, intent, or plan  Perception: denies psychosis today  Fund of knowledge and vocabulary: Adequate.  Memory: Recent memory adequate, Remote memory limited  Insight: Poor.  Judgment: Poor.     Medical Records/Labs/Diagnostic Tests:   records reviewed, recent relevant provider encounters reviewed, recent relevant labs in record reviewed     Strengths/Assets:  Patient strengths identified included resilience, effectively addressed past stressors/challenges      Impression:  Schizoaffective disorder, depressive type (HCC)   Alcohol abuse, in early remission    Plan:  1. Medication options, alternatives (including no medications) and medication risks/benefits/side effects were discussed in detail.    2. Continue sertraline 200mg po daily for continued depressive/anxiety symptoms.   3. Continue olanzapine 15 mg p.o. nightly for psychosis and mood instability.  4. Continue trazodone 50 mg p.o. nightly as needed insomnia for continued issues with sleep.  5. Continue propranolol 10 mg p.o. twice daily for continued anxiety symptoms  6. Encouraged to continue therapy at this clinic.  7. Encouraged socialization, encouraged AA.  8. Counseled on smoking cessation.  9. The patient was advised to call, message provider on MyChart, or come in to the clinic if symptoms worsen or if any future questions/issues regarding their medications arise; the patient verbalized understanding and agreement.   10. The patient was educated to call 911, call the suicide " hotline, or go to local ER if having thoughts of suicide or homicide; verbalized understanding.    Return to clinic in *** or sooner if symptoms worsen    The proposed treatment plan was discussed with the patient who was provided the opportunity to ask questions and make suggestions regarding alternative treatment. Patient verbalized understanding and expressed agreement with the plan.     Total face-to-face time: *** minutes with more than 50% of face-to-face time spent in counseling and coordinating care as stated in the above plan.     Trenton Murrieta A.P.R.N.  05/19/19    This note was created using voice recognition software (Dragon). The accuracy of the dictation is limited by the abilities of the software. I have reviewed the note prior to signing, however some errors in grammar and context are still possible. If you have any questions related to this note please do not hesitate to contact our office.

## 2019-05-28 ENCOUNTER — APPOINTMENT (OUTPATIENT)
Dept: BEHAVIORAL HEALTH | Facility: CLINIC | Age: 69
End: 2019-05-28
Payer: MEDICARE

## 2019-06-17 NOTE — PROGRESS NOTES
"PSYCHIATRY FOLLOW-UP NOTE    Chief Complaint:    \"I've been more depressed.\"    History Of Present Illness:  Boyd Askew is a 68 y.o. male with psychosis and depression comes in today for follow up, was last seen in this office by this provider on 4.23.19.     The patient has been feeling depressed lately for the past few months.  He is sleeping more, approximately 10hrs/day.  He has been experiencing mild anhedonia.  His appetite has been \"fine.\" He denies suicidal thoughts, passive or active. He has been anxious, mostly about financial issues, but nothing out of the norm for him.  He has been having mood swings, often varying throughout the course of the day. Staying busy and going for walks helps with this.  He denies AH/VH/paranoia. He denies suicidal thoughts, passive or active.  He has not been in therapy lately, but aspires to get back in.     He has been drinking again, every day, approximately 3+ quarts of beer daily since getting out of [facility].  He has not been going to [employer].  He mentions his wife is again annoyed at his drinking.  He has been going for walks and playing board games.  However, he gets \"real bored real fast\" which leads to drinking again.  He is not interested in getting sober at this time, \"I'll just end up drinking again ... There's no point.\"  He requests Xanax today to help with depression and anxiety.  He is uninterested in any other medication changes besides this, even after much education.     Current Psychotropic Medications:  Sertraline 200 mg p.o. daily  Olanzapine 15 mg p.o. nightly  Trazodone 50 mg p.o. q. at bedtime as needed insomnia  Propranolol 10 mg p.o. twice daily     Past Psychotropic Medication Trials:  Patient is unable to recall.     Social History:   He is a  man on a limited income.  He enjoys going for walks and to movies with his wife. He recently went down to [place] 2 weeks ago as his mother-in-law was not doing well. She has since passed. " "     Substance Use:  Alcohol: 3+ quarts of alcohol/day  Nicotine: He is a current smoker.  He has been for approximately 50 years.  Illicit drugs: Smokes marijuana occasionally.  Caffeine: He drinks 2 cups of coffee a day.    Medications:  Current Outpatient Prescriptions   Medication Sig Dispense Refill   • olanzapine (ZYPREXA) 15 MG tablet TAKE  ONE TABLET BY MOUTH NIGHTLY AT BEDTIME 30 Tab 0   • propranolol (INDERAL) 10 MG Tab TAKE ONE TABLET BY MOUTH TWICE DAILY 60 Tab 0   • sertraline (ZOLOFT) 100 MG Tab Take 2 Tabs by mouth every day for 60 days. 60 Tab 1   • traZODone (DESYREL) 50 MG Tab TAKE ONE TABLET BY MOUTH AT BEDTIME 90 Tab 3   • atorvastatin (LIPITOR) 20 MG Tab Take 1 Tab by mouth every bedtime. 90 Tab 0   • ibuprofen (MOTRIN) 600 MG Tab TAKE ONE TABLET BY MOUTH EVERY 8 HOURS AS NEEDED FOR MODERATE PAIN. 90 Tab 3     No current facility-administered medications for this visit.      Depression Screening (PHQ-9 Score):   Depression Screen (PHQ-2/PHQ-9) 5/22/2018 2/25/2019 4/23/2019   PHQ-2 Total Score - - -   PHQ-2 Total Score 1 3 0   PHQ-9 Total Score 2 11 -     Interpretation of PHQ-9 Total Score   Score Severity   1-4 No Depression   5-9 Mild Depression   10-14 Moderate Depression   15-19 Moderately Severe Depression   20-27 Severe Depression    Physical Examination:  138/82, 72, 18, 235lbs, 5'9\"     Review of Symptoms:  Constitutional: denies recent chills, fevers.   Neuro: denies recent weakness, numbness, or headaches.   HEENT: denies recent nasal congestion or sore throat.  Cardiovascular:  History of a mitral valve disorder and dyslipidemia  Respiratory: long-term smoker, \"smoker's cough\"  GI: History of GERD.  : Hx of ED.  denies recent urinary urgency frequency or urgency.  Musculoskeletal: tone is good with normal gait and station, broad range of motion, and good balance. No abnormal movements noted.  History of back pain and arthritis.  Skin: denies recent rash or skin lesions.   Endocrine: " "pre-diabetes  Hematologic:  He has a history of a venous insufficiency problem.  Psychiatric: Positive for symptoms of depression, anxiety, psychosis, and alcohol misuse.     Mental Status Examination:   Appearance: 68 y.o.  male, dressed in casual attire, overweight, grooming and hygiene appropriate  Behavior: calm and cooperative, good eye contact  Participation: active verbal participation, engaged  Speech: regular rate, regular volume, regular rhythm, language appropriate  Mood: \"okay.\"  Affect: euthymic and mood congruent  Orientation: alert and oriented to person, place, situation, and time.  Attention/concentration: Fair.  Associations/Abstract reasoning:Adequate.   Thought Process: linear, logical, and goal-directed  Thought Content: denies passive/active suicidal or homicidal ideations, intent, or plan  Perception: denies psychosis today  Fund of knowledge and vocabulary: Adequate.  Memory: Recent memory adequate, Remote memory limited  Insight: Poor.  Judgment: Poor.     Medical Records/Labs/Diagnostic Tests:   records reviewed, recent relevant provider encounters reviewed, recent relevant labs in record reviewed     Strengths/Assets:  Patient strengths identified included resilience, effectively addressed past stressors/challenges     Impression:  1. Schizoaffective disorder, depressive type (HCC)      2. Alcohol abuse, continuous        Plan:  1. Medication options, alternatives (including no medications) and medication risks/benefits/side effects were discussed in detail.    2. Continue sertraline 200mg po daily for continued depressive/anxiety symptoms.   3. Continue olanzapine 15 mg p.o. nightly for psychosis and mood instability.  4. Continue trazodone 50 mg p.o. nightly as needed insomnia for continued issues with sleep.  5. Continue propranolol 10 mg p.o. twice daily for continued anxiety symptoms  6. Discussed in detail the potential deadly effects of combining benzodiazepines with " alcohol. Made clear this provider would not prescribe them at time due to this.  7. Encouraged to reestablish therapy at this clinic.  8. Encouraged sobriety and AA.  9. The patient was advised to call, message provider on MyChart, or come in to the clinic if symptoms worsen or if any future questions/issues regarding their medications arise; the patient verbalized understanding and agreement.   10. The patient was educated to call 911, call the suicide hotline, or go to local ER if having thoughts of suicide or homicide; verbalized understanding.    Return to clinic in 4 weeks (per his request) or sooner if symptoms worsen    The proposed treatment plan was discussed with the patient who was provided the opportunity to ask questions and make suggestions regarding alternative treatment. Patient verbalized understanding and expressed agreement with the plan.     Total face-to-face time: 30 minutes with more than 50% of face-to-face time spent in counseling and coordinating care as stated in the above plan.     MARA HenleyR.N.  06/17/19    This note was created using voice recognition software (Dragon). The accuracy of the dictation is limited by the abilities of the software. I have reviewed the note prior to signing, however some errors in grammar and context are still possible. If you have any questions related to this note please do not hesitate to contact our office.

## 2019-06-18 ENCOUNTER — OFFICE VISIT (OUTPATIENT)
Dept: BEHAVIORAL HEALTH | Facility: CLINIC | Age: 69
End: 2019-06-18
Payer: MEDICARE

## 2019-06-18 VITALS
HEART RATE: 72 BPM | BODY MASS INDEX: 34.8 KG/M2 | RESPIRATION RATE: 18 BRPM | WEIGHT: 235 LBS | HEIGHT: 69 IN | DIASTOLIC BLOOD PRESSURE: 82 MMHG | SYSTOLIC BLOOD PRESSURE: 138 MMHG

## 2019-06-18 DIAGNOSIS — F10.10 ALCOHOL ABUSE: ICD-10-CM

## 2019-06-18 DIAGNOSIS — F25.1 SCHIZOAFFECTIVE DISORDER, DEPRESSIVE TYPE (HCC): ICD-10-CM

## 2019-06-18 PROCEDURE — 99214 OFFICE O/P EST MOD 30 MIN: CPT | Performed by: NURSE PRACTITIONER

## 2019-06-19 ENCOUNTER — TELEPHONE (OUTPATIENT)
Dept: BEHAVIORAL HEALTH | Facility: CLINIC | Age: 69
End: 2019-06-19

## 2019-07-10 RX ORDER — PROPRANOLOL HYDROCHLORIDE 10 MG/1
TABLET ORAL
Qty: 60 TAB | Refills: 0 | Status: SHIPPED | OUTPATIENT
Start: 2019-07-10 | End: 2019-08-13

## 2019-07-24 RX ORDER — OLANZAPINE 15 MG/1
TABLET ORAL
Qty: 30 TAB | Refills: 0 | Status: SHIPPED | OUTPATIENT
Start: 2019-07-24 | End: 2019-08-12 | Stop reason: SDUPTHER

## 2021-02-05 ENCOUNTER — APPOINTMENT (OUTPATIENT)
Dept: RADIOLOGY | Facility: MEDICAL CENTER | Age: 71
DRG: 641 | End: 2021-02-05
Attending: EMERGENCY MEDICINE
Payer: MEDICARE

## 2021-02-05 ENCOUNTER — HOSPITAL ENCOUNTER (INPATIENT)
Facility: MEDICAL CENTER | Age: 71
LOS: 2 days | DRG: 641 | End: 2021-02-08
Attending: EMERGENCY MEDICINE | Admitting: STUDENT IN AN ORGANIZED HEALTH CARE EDUCATION/TRAINING PROGRAM
Payer: MEDICARE

## 2021-02-05 DIAGNOSIS — R54 AGE-RELATED PHYSICAL DEBILITY: ICD-10-CM

## 2021-02-05 DIAGNOSIS — E78.5 DYSLIPIDEMIA: ICD-10-CM

## 2021-02-05 DIAGNOSIS — E87.1 HYPONATREMIA: ICD-10-CM

## 2021-02-05 DIAGNOSIS — R53.1 WEAKNESS: ICD-10-CM

## 2021-02-05 DIAGNOSIS — N30.00 ACUTE CYSTITIS WITHOUT HEMATURIA: ICD-10-CM
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
## 2021-02-05 LAB
APTT PPP: 33.4 SEC (ref 24.7–36)
BASOPHILS # BLD AUTO: 0.2 % (ref 0–1.8)
BASOPHILS # BLD: 0.03 K/UL (ref 0–0.12)
EKG IMPRESSION: NORMAL
EOSINOPHIL # BLD AUTO: 0 K/UL (ref 0–0.51)
EOSINOPHIL NFR BLD: 0 % (ref 0–6.9)
ERYTHROCYTE [DISTWIDTH] IN BLOOD BY AUTOMATED COUNT: 51.9 FL (ref 35.9–50)
HCT VFR BLD AUTO: 41 % (ref 42–52)
HGB BLD-MCNC: 14.3 G/DL (ref 14–18)
IMM GRANULOCYTES # BLD AUTO: 0.12 K/UL (ref 0–0.11)
IMM GRANULOCYTES NFR BLD AUTO: 0.9 % (ref 0–0.9)
INR PPP: 0.96 (ref 0.87–1.13)
LYMPHOCYTES # BLD AUTO: 0.75 K/UL (ref 1–4.8)
LYMPHOCYTES NFR BLD: 5.5 % (ref 22–41)
MCH RBC QN AUTO: 33.4 PG (ref 27–33)
MCHC RBC AUTO-ENTMCNC: 34.9 G/DL (ref 33.7–35.3)
MCV RBC AUTO: 95.8 FL (ref 81.4–97.8)
MONOCYTES # BLD AUTO: 0.88 K/UL (ref 0–0.85)
MONOCYTES NFR BLD AUTO: 6.5 % (ref 0–13.4)
NEUTROPHILS # BLD AUTO: 11.76 K/UL (ref 1.82–7.42)
NEUTROPHILS NFR BLD: 86.9 % (ref 44–72)
NRBC # BLD AUTO: 0.06 K/UL
NRBC BLD-RTO: 0.4 /100 WBC
PLATELET # BLD AUTO: 290 K/UL (ref 164–446)
PMV BLD AUTO: 10 FL (ref 9–12.9)
PROTHROMBIN TIME: 13 SEC (ref 12–14.6)
RBC # BLD AUTO: 4.28 M/UL (ref 4.7–6.1)
WBC # BLD AUTO: 13.5 K/UL (ref 4.8–10.8)

## 2021-02-05 PROCEDURE — 85730 THROMBOPLASTIN TIME PARTIAL: CPT

## 2021-02-05 PROCEDURE — 99285 EMERGENCY DEPT VISIT HI MDM: CPT

## 2021-02-05 PROCEDURE — 85025 COMPLETE CBC W/AUTO DIFF WBC: CPT

## 2021-02-05 PROCEDURE — 700111 HCHG RX REV CODE 636 W/ 250 OVERRIDE (IP): Performed by: EMERGENCY MEDICINE

## 2021-02-05 PROCEDURE — 82077 ASSAY SPEC XCP UR&BREATH IA: CPT

## 2021-02-05 PROCEDURE — 90715 TDAP VACCINE 7 YRS/> IM: CPT | Performed by: EMERGENCY MEDICINE

## 2021-02-05 PROCEDURE — 700101 HCHG RX REV CODE 250: Performed by: EMERGENCY MEDICINE

## 2021-02-05 PROCEDURE — 70450 CT HEAD/BRAIN W/O DYE: CPT

## 2021-02-05 PROCEDURE — 3E0234Z INTRODUCTION OF SERUM, TOXOID AND VACCINE INTO MUSCLE, PERCUTANEOUS APPROACH: ICD-10-PCS | Performed by: FAMILY MEDICINE

## 2021-02-05 PROCEDURE — 90471 IMMUNIZATION ADMIN: CPT

## 2021-02-05 PROCEDURE — 85610 PROTHROMBIN TIME: CPT

## 2021-02-05 PROCEDURE — 93005 ELECTROCARDIOGRAM TRACING: CPT | Performed by: EMERGENCY MEDICINE

## 2021-02-05 PROCEDURE — 80053 COMPREHEN METABOLIC PANEL: CPT

## 2021-02-05 PROCEDURE — 96365 THER/PROPH/DIAG IV INF INIT: CPT

## 2021-02-05 PROCEDURE — 96366 THER/PROPH/DIAG IV INF ADDON: CPT

## 2021-02-05 RX ORDER — HALOPERIDOL 5 MG/1
5 TABLET ORAL 4 TIMES DAILY
COMMUNITY
End: 2021-02-05

## 2021-02-05 RX ADMIN — THIAMINE HYDROCHLORIDE 1000 ML: 100 INJECTION, SOLUTION INTRAMUSCULAR; INTRAVENOUS at 22:17

## 2021-02-05 RX ADMIN — CLOSTRIDIUM TETANI TOXOID ANTIGEN (FORMALDEHYDE INACTIVATED), CORYNEBACTERIUM DIPHTHERIAE TOXOID ANTIGEN (FORMALDEHYDE INACTIVATED), BORDETELLA PERTUSSIS TOXOID ANTIGEN (GLUTARALDEHYDE INACTIVATED), BORDETELLA PERTUSSIS FILAMENTOUS HEMAGGLUTININ ANTIGEN (FORMALDEHYDE INACTIVATED), BORDETELLA PERTUSSIS PERTACTIN ANTIGEN, AND BORDETELLA PERTUSSIS FIMBRIAE 2/3 ANTIGEN 0.5 ML: 5; 2; 2.5; 5; 3; 5 INJECTION, SUSPENSION INTRAMUSCULAR at 22:17

## 2021-02-06 PROBLEM — E87.1 HYPONATREMIA: Status: ACTIVE | Noted: 2021-02-06

## 2021-02-06 PROBLEM — R62.7 ADULT FAILURE TO THRIVE: Status: ACTIVE | Noted: 2021-02-06

## 2021-02-06 PROBLEM — N39.0 URINARY TRACT INFECTION: Status: ACTIVE | Noted: 2021-02-06

## 2021-02-06 PROBLEM — F10.20 ALCOHOL DEPENDENCE (HCC): Status: ACTIVE | Noted: 2019-02-25

## 2021-02-06 PROBLEM — E86.0 DEHYDRATION: Status: ACTIVE | Noted: 2021-02-06

## 2021-02-06 PROBLEM — F43.21 GRIEVING: Status: ACTIVE | Noted: 2021-02-06

## 2021-02-06 PROBLEM — E43 SEVERE PROTEIN-CALORIE MALNUTRITION (HCC): Status: ACTIVE | Noted: 2021-02-06

## 2021-02-06 LAB
ALBUMIN SERPL BCP-MCNC: 2.8 G/DL (ref 3.2–4.9)
ALBUMIN SERPL BCP-MCNC: 3.6 G/DL (ref 3.2–4.9)
ALBUMIN/GLOB SERPL: 0.9 G/DL
ALBUMIN/GLOB SERPL: 0.9 G/DL
ALP SERPL-CCNC: 71 U/L (ref 30–99)
ALP SERPL-CCNC: 93 U/L (ref 30–99)
ALT SERPL-CCNC: 14 U/L (ref 2–50)
ALT SERPL-CCNC: 21 U/L (ref 2–50)
ANION GAP SERPL CALC-SCNC: 13 MMOL/L (ref 7–16)
ANION GAP SERPL CALC-SCNC: 19 MMOL/L (ref 7–16)
APPEARANCE UR: CLEAR
AST SERPL-CCNC: 15 U/L (ref 12–45)
AST SERPL-CCNC: 24 U/L (ref 12–45)
BACTERIA #/AREA URNS HPF: NEGATIVE /HPF
BASOPHILS # BLD AUTO: 0.4 % (ref 0–1.8)
BASOPHILS # BLD: 0.04 K/UL (ref 0–0.12)
BILIRUB SERPL-MCNC: 0.7 MG/DL (ref 0.1–1.5)
BILIRUB SERPL-MCNC: 1 MG/DL (ref 0.1–1.5)
BILIRUB UR QL STRIP.AUTO: ABNORMAL
BUN SERPL-MCNC: 15 MG/DL (ref 8–22)
BUN SERPL-MCNC: 23 MG/DL (ref 8–22)
CALCIUM SERPL-MCNC: 8.4 MG/DL (ref 8.5–10.5)
CALCIUM SERPL-MCNC: 9.5 MG/DL (ref 8.5–10.5)
CHLORIDE SERPL-SCNC: 83 MMOL/L (ref 96–112)
CHLORIDE SERPL-SCNC: 93 MMOL/L (ref 96–112)
CK SERPL-CCNC: 29 U/L (ref 0–154)
CO2 SERPL-SCNC: 24 MMOL/L (ref 20–33)
CO2 SERPL-SCNC: 24 MMOL/L (ref 20–33)
COLOR UR: ABNORMAL
CREAT SERPL-MCNC: 0.41 MG/DL (ref 0.5–1.4)
CREAT SERPL-MCNC: 0.62 MG/DL (ref 0.5–1.4)
EOSINOPHIL # BLD AUTO: 0.13 K/UL (ref 0–0.51)
EOSINOPHIL NFR BLD: 1.3 % (ref 0–6.9)
EPI CELLS #/AREA URNS HPF: NEGATIVE /HPF
ERYTHROCYTE [DISTWIDTH] IN BLOOD BY AUTOMATED COUNT: 51.5 FL (ref 35.9–50)
ETHANOL BLD-MCNC: <10.1 MG/DL (ref 0–10)
GLOBULIN SER CALC-MCNC: 3.2 G/DL (ref 1.9–3.5)
GLOBULIN SER CALC-MCNC: 3.9 G/DL (ref 1.9–3.5)
GLUCOSE SERPL-MCNC: 124 MG/DL (ref 65–99)
GLUCOSE SERPL-MCNC: 78 MG/DL (ref 65–99)
GLUCOSE UR STRIP.AUTO-MCNC: NEGATIVE MG/DL
HCT VFR BLD AUTO: 33.5 % (ref 42–52)
HGB BLD-MCNC: 11.7 G/DL (ref 14–18)
IMM GRANULOCYTES # BLD AUTO: 0.06 K/UL (ref 0–0.11)
IMM GRANULOCYTES NFR BLD AUTO: 0.6 % (ref 0–0.9)
KETONES UR STRIP.AUTO-MCNC: 15 MG/DL
LEUKOCYTE ESTERASE UR QL STRIP.AUTO: ABNORMAL
LYMPHOCYTES # BLD AUTO: 2.13 K/UL (ref 1–4.8)
LYMPHOCYTES NFR BLD: 20.7 % (ref 22–41)
MAGNESIUM SERPL-MCNC: 2.1 MG/DL (ref 1.5–2.5)
MCH RBC QN AUTO: 33.3 PG (ref 27–33)
MCHC RBC AUTO-ENTMCNC: 34.9 G/DL (ref 33.7–35.3)
MCV RBC AUTO: 95.4 FL (ref 81.4–97.8)
MICRO URNS: ABNORMAL
MONOCYTES # BLD AUTO: 0.74 K/UL (ref 0–0.85)
MONOCYTES NFR BLD AUTO: 7.2 % (ref 0–13.4)
NEUTROPHILS # BLD AUTO: 7.17 K/UL (ref 1.82–7.42)
NEUTROPHILS NFR BLD: 69.8 % (ref 44–72)
NITRITE UR QL STRIP.AUTO: POSITIVE
NRBC # BLD AUTO: 0.06 K/UL
NRBC BLD-RTO: 0.6 /100 WBC
PH UR STRIP.AUTO: 6 [PH] (ref 5–8)
PHOSPHATE SERPL-MCNC: 2.7 MG/DL (ref 2.5–4.5)
PLATELET # BLD AUTO: 218 K/UL (ref 164–446)
PMV BLD AUTO: 9.3 FL (ref 9–12.9)
POTASSIUM SERPL-SCNC: 2.7 MMOL/L (ref 3.6–5.5)
POTASSIUM SERPL-SCNC: 2.7 MMOL/L (ref 3.6–5.5)
POTASSIUM SERPL-SCNC: 3.7 MMOL/L (ref 3.6–5.5)
PROT SERPL-MCNC: 6 G/DL (ref 6–8.2)
PROT SERPL-MCNC: 7.5 G/DL (ref 6–8.2)
PROT UR QL STRIP: NEGATIVE MG/DL
RBC # BLD AUTO: 3.51 M/UL (ref 4.7–6.1)
RBC # URNS HPF: ABNORMAL /HPF
RBC UR QL AUTO: NEGATIVE
SARS-COV-2 RNA RESP QL NAA+PROBE: NOTDETECTED
SODIUM SERPL-SCNC: 126 MMOL/L (ref 135–145)
SODIUM SERPL-SCNC: 130 MMOL/L (ref 135–145)
SP GR UR STRIP.AUTO: 1.02
SPECIMEN SOURCE: NORMAL
UROBILINOGEN UR STRIP.AUTO-MCNC: 1 MG/DL
WBC # BLD AUTO: 10.3 K/UL (ref 4.8–10.8)
WBC #/AREA URNS HPF: ABNORMAL /HPF

## 2021-02-06 PROCEDURE — 770001 HCHG ROOM/CARE - MED/SURG/GYN PRIV*

## 2021-02-06 PROCEDURE — A9270 NON-COVERED ITEM OR SERVICE: HCPCS | Performed by: NURSE PRACTITIONER

## 2021-02-06 PROCEDURE — 83735 ASSAY OF MAGNESIUM: CPT

## 2021-02-06 PROCEDURE — 80053 COMPREHEN METABOLIC PANEL: CPT

## 2021-02-06 PROCEDURE — 97165 OT EVAL LOW COMPLEX 30 MIN: CPT

## 2021-02-06 PROCEDURE — 84132 ASSAY OF SERUM POTASSIUM: CPT

## 2021-02-06 PROCEDURE — 84100 ASSAY OF PHOSPHORUS: CPT

## 2021-02-06 PROCEDURE — 96372 THER/PROPH/DIAG INJ SC/IM: CPT

## 2021-02-06 PROCEDURE — 700105 HCHG RX REV CODE 258: Performed by: STUDENT IN AN ORGANIZED HEALTH CARE EDUCATION/TRAINING PROGRAM

## 2021-02-06 PROCEDURE — A9270 NON-COVERED ITEM OR SERVICE: HCPCS | Performed by: EMERGENCY MEDICINE

## 2021-02-06 PROCEDURE — 700102 HCHG RX REV CODE 250 W/ 637 OVERRIDE(OP): Performed by: STUDENT IN AN ORGANIZED HEALTH CARE EDUCATION/TRAINING PROGRAM

## 2021-02-06 PROCEDURE — 99222 1ST HOSP IP/OBS MODERATE 55: CPT | Mod: AI | Performed by: STUDENT IN AN ORGANIZED HEALTH CARE EDUCATION/TRAINING PROGRAM

## 2021-02-06 PROCEDURE — 82550 ASSAY OF CK (CPK): CPT

## 2021-02-06 PROCEDURE — 700111 HCHG RX REV CODE 636 W/ 250 OVERRIDE (IP): Performed by: STUDENT IN AN ORGANIZED HEALTH CARE EDUCATION/TRAINING PROGRAM

## 2021-02-06 PROCEDURE — U0003 INFECTIOUS AGENT DETECTION BY NUCLEIC ACID (DNA OR RNA); SEVERE ACUTE RESPIRATORY SYNDROME CORONAVIRUS 2 (SARS-COV-2) (CORONAVIRUS DISEASE [COVID-19]), AMPLIFIED PROBE TECHNIQUE, MAKING USE OF HIGH THROUGHPUT TECHNOLOGIES AS DESCRIBED BY CMS-2020-01-R: HCPCS

## 2021-02-06 PROCEDURE — U0005 INFEC AGEN DETEC AMPLI PROBE: HCPCS

## 2021-02-06 PROCEDURE — 700102 HCHG RX REV CODE 250 W/ 637 OVERRIDE(OP): Performed by: EMERGENCY MEDICINE

## 2021-02-06 PROCEDURE — 87040 BLOOD CULTURE FOR BACTERIA: CPT | Mod: 91

## 2021-02-06 PROCEDURE — 85025 COMPLETE CBC W/AUTO DIFF WBC: CPT

## 2021-02-06 PROCEDURE — 700102 HCHG RX REV CODE 250 W/ 637 OVERRIDE(OP): Performed by: NURSE PRACTITIONER

## 2021-02-06 PROCEDURE — A9270 NON-COVERED ITEM OR SERVICE: HCPCS | Performed by: STUDENT IN AN ORGANIZED HEALTH CARE EDUCATION/TRAINING PROGRAM

## 2021-02-06 PROCEDURE — 97161 PT EVAL LOW COMPLEX 20 MIN: CPT

## 2021-02-06 PROCEDURE — 81001 URINALYSIS AUTO W/SCOPE: CPT

## 2021-02-06 RX ORDER — POTASSIUM CHLORIDE 20 MEQ/1
40 TABLET, EXTENDED RELEASE ORAL ONCE
Status: COMPLETED | OUTPATIENT
Start: 2021-02-06 | End: 2021-02-06

## 2021-02-06 RX ORDER — AMOXICILLIN 250 MG
2 CAPSULE ORAL 2 TIMES DAILY
Status: DISCONTINUED | OUTPATIENT
Start: 2021-02-06 | End: 2021-02-08 | Stop reason: HOSPADM

## 2021-02-06 RX ORDER — CEPHALEXIN 500 MG/1
500 CAPSULE ORAL ONCE
Status: COMPLETED | OUTPATIENT
Start: 2021-02-06 | End: 2021-02-06

## 2021-02-06 RX ORDER — SODIUM CHLORIDE 9 MG/ML
1000 INJECTION, SOLUTION INTRAVENOUS CONTINUOUS
Status: DISCONTINUED | OUTPATIENT
Start: 2021-02-06 | End: 2021-02-06

## 2021-02-06 RX ORDER — POLYETHYLENE GLYCOL 3350 17 G/17G
1 POWDER, FOR SOLUTION ORAL
Status: DISCONTINUED | OUTPATIENT
Start: 2021-02-06 | End: 2021-02-08 | Stop reason: HOSPADM

## 2021-02-06 RX ORDER — NALTREXONE HYDROCHLORIDE 50 MG/1
50 TABLET, FILM COATED ORAL DAILY
Status: ON HOLD | COMMUNITY
End: 2021-02-08

## 2021-02-06 RX ORDER — CEPHALEXIN 500 MG/1
500 CAPSULE ORAL EVERY 6 HOURS
Status: DISCONTINUED | OUTPATIENT
Start: 2021-02-06 | End: 2021-02-06

## 2021-02-06 RX ORDER — HALOPERIDOL 5 MG/1
10 TABLET ORAL
Status: ON HOLD | COMMUNITY
End: 2021-02-08

## 2021-02-06 RX ORDER — BISACODYL 10 MG
10 SUPPOSITORY, RECTAL RECTAL
Status: DISCONTINUED | OUTPATIENT
Start: 2021-02-06 | End: 2021-02-08 | Stop reason: HOSPADM

## 2021-02-06 RX ADMIN — CEPHALEXIN 500 MG: 500 CAPSULE ORAL at 01:01

## 2021-02-06 RX ADMIN — DOCUSATE SODIUM 50 MG AND SENNOSIDES 8.6 MG 2 TABLET: 8.6; 5 TABLET, FILM COATED ORAL at 05:20

## 2021-02-06 RX ADMIN — SODIUM CHLORIDE 1000 ML: 9 INJECTION, SOLUTION INTRAVENOUS at 02:59

## 2021-02-06 RX ADMIN — POTASSIUM CHLORIDE 40 MEQ: 1500 TABLET, EXTENDED RELEASE ORAL at 15:41

## 2021-02-06 RX ADMIN — CEPHALEXIN 500 MG: 500 CAPSULE ORAL at 05:20

## 2021-02-06 RX ADMIN — POTASSIUM CHLORIDE 40 MEQ: 1500 TABLET, EXTENDED RELEASE ORAL at 10:47

## 2021-02-06 RX ADMIN — ENOXAPARIN SODIUM 40 MG: 40 INJECTION SUBCUTANEOUS at 05:20

## 2021-02-06 RX ADMIN — POTASSIUM CHLORIDE 40 MEQ: 1500 TABLET, EXTENDED RELEASE ORAL at 17:59

## 2021-02-06 ASSESSMENT — ENCOUNTER SYMPTOMS
ABDOMINAL PAIN: 0
BRUISES/BLEEDS EASILY: 0
NERVOUS/ANXIOUS: 0
BACK PAIN: 1
BLURRED VISION: 0
WEAKNESS: 1
FLANK PAIN: 0
MYALGIAS: 0
VOMITING: 0
DEPRESSION: 0
CHILLS: 0
PALPITATIONS: 0
HEADACHES: 0
COUGH: 0
FEVER: 0
FALLS: 1
HEARTBURN: 0
DOUBLE VISION: 0
SHORTNESS OF BREATH: 0
DIARRHEA: 0
DIZZINESS: 0
NAUSEA: 0
WHEEZING: 0
TINGLING: 0
INSOMNIA: 0
CONSTIPATION: 0

## 2021-02-06 ASSESSMENT — COGNITIVE AND FUNCTIONAL STATUS - GENERAL
STANDING UP FROM CHAIR USING ARMS: A LOT
HELP NEEDED FOR BATHING: A LOT
DRESSING REGULAR UPPER BODY CLOTHING: A LITTLE
TOILETING: A LITTLE
TURNING FROM BACK TO SIDE WHILE IN FLAT BAD: A LITTLE
SUGGESTED CMS G CODE MODIFIER MOBILITY: CL
DAILY ACTIVITIY SCORE: 16
DRESSING REGULAR LOWER BODY CLOTHING: A LOT
MOVING TO AND FROM BED TO CHAIR: A LITTLE
MOBILITY SCORE: 14
EATING MEALS: A LITTLE
PERSONAL GROOMING: A LITTLE
CLIMB 3 TO 5 STEPS WITH RAILING: TOTAL
SUGGESTED CMS G CODE MODIFIER DAILY ACTIVITY: CK
MOVING FROM LYING ON BACK TO SITTING ON SIDE OF FLAT BED: A LITTLE
WALKING IN HOSPITAL ROOM: A LOT

## 2021-02-06 ASSESSMENT — LIFESTYLE VARIABLES
TOTAL SCORE: 3
EVER FELT BAD OR GUILTY ABOUT YOUR DRINKING: YES
ALCOHOL_USE: YES
HAVE YOU EVER FELT YOU SHOULD CUT DOWN ON YOUR DRINKING: YES
EVER HAD A DRINK FIRST THING IN THE MORNING TO STEADY YOUR NERVES TO GET RID OF A HANGOVER: NO
HOW MANY TIMES IN THE PAST YEAR HAVE YOU HAD 5 OR MORE DRINKS IN A DAY: 40
HAVE PEOPLE ANNOYED YOU BY CRITICIZING YOUR DRINKING: YES
TOTAL SCORE: 3
DOES PATIENT WANT TO STOP DRINKING: YES
AVERAGE NUMBER OF DAYS PER WEEK YOU HAVE A DRINK CONTAINING ALCOHOL: 7
ON A TYPICAL DAY WHEN YOU DRINK ALCOHOL HOW MANY DRINKS DO YOU HAVE: 4
TOTAL SCORE: 3
DOES PATIENT WANT TO TALK TO SOMEONE ABOUT QUITTING: NO
CONSUMPTION TOTAL: POSITIVE

## 2021-02-06 ASSESSMENT — FIBROSIS 4 INDEX: FIB4 SCORE: 1.23

## 2021-02-06 ASSESSMENT — PATIENT HEALTH QUESTIONNAIRE - PHQ9
7. TROUBLE CONCENTRATING ON THINGS, SUCH AS READING THE NEWSPAPER OR WATCHING TELEVISION: SEVERAL DAYS
4. FEELING TIRED OR HAVING LITTLE ENERGY: SEVERAL DAYS
SUM OF ALL RESPONSES TO PHQ9 QUESTIONS 1 AND 2: 2
SUM OF ALL RESPONSES TO PHQ QUESTIONS 1-9: 7
1. LITTLE INTEREST OR PLEASURE IN DOING THINGS: SEVERAL DAYS
8. MOVING OR SPEAKING SO SLOWLY THAT OTHER PEOPLE COULD HAVE NOTICED. OR THE OPPOSITE, BEING SO FIGETY OR RESTLESS THAT YOU HAVE BEEN MOVING AROUND A LOT MORE THAN USUAL: SEVERAL DAYS
6. FEELING BAD ABOUT YOURSELF - OR THAT YOU ARE A FAILURE OR HAVE LET YOURSELF OR YOUR FAMILY DOWN: NOT AL ALL
5. POOR APPETITE OR OVEREATING: SEVERAL DAYS
3. TROUBLE FALLING OR STAYING ASLEEP OR SLEEPING TOO MUCH: SEVERAL DAYS
9. THOUGHTS THAT YOU WOULD BE BETTER OFF DEAD, OR OF HURTING YOURSELF: NOT AT ALL
2. FEELING DOWN, DEPRESSED, IRRITABLE, OR HOPELESS: SEVERAL DAYS

## 2021-02-06 ASSESSMENT — ACTIVITIES OF DAILY LIVING (ADL): TOILETING: INDEPENDENT

## 2021-02-06 ASSESSMENT — GAIT ASSESSMENTS
DEVIATION: BRADYKINETIC;SHUFFLED GAIT;DECREASED HEEL STRIKE;DECREASED TOE OFF
ASSISTIVE DEVICE: FRONT WHEEL WALKER
GAIT LEVEL OF ASSIST: MINIMAL ASSIST
DISTANCE (FEET): 20

## 2021-02-06 ASSESSMENT — PAIN DESCRIPTION - PAIN TYPE: TYPE: ACUTE PAIN

## 2021-02-06 NOTE — ED NOTES
Patient unable to use urinal. Agreed to be cath for urine sample. Sample obtain. Patient tolerated well.

## 2021-02-06 NOTE — ASSESSMENT & PLAN NOTE
-Quit cold turkey 1 week prior to presentation  -Continue daily thiamine, folic acid and MVI -IV thiamine while hospitalized, p.o. at DC  -Seizure precautions  -Ongoing alcohol cessation education and counseling

## 2021-02-06 NOTE — PROGRESS NOTES
Med rec updated and complete per Pt's pharmacy [facility] on [address] Pt has not filled any medications since December of 2020. Pt's last dispensed medications were:    -Haloperidol 5 mg two tablets at bedtime; dispensed 12/10/2020 for a 30 day supply  -Naltrexone 50 mg one tablet per day; dispensed 12/10/2020 for a 30 day supply    Unable to verify it Pt has been taking these medications as prescribed.

## 2021-02-06 NOTE — PROGRESS NOTES
Assessment completed.  Pt A&Ox4.  Respirations even, unlabored on room air.  Pt denies any pain at this time.  POC discussed: awaiting PT/OT; communication board updated.    Bed in locked, lowest position.  Call light and belongings within reach.  Needs met, will continue to monitor.

## 2021-02-06 NOTE — PROGRESS NOTES
Patient admitted from to Room 203 from ED with weakness, GLF, cystitis without hematuria, failure to thrive with history of smoking and ETOH.    Pt oriented to room, call bell and Plan of care including bed alarm, call bell, plan of car including IV fluids.  Patient verbalized understanding of all but will continue to require reinforcement secondary to cognitive delays, poor attention span and comprehension.    See physical assessment. Pt assessed but refuses to remove his jeans.  Offered PJ bottoms but pt refused.  Physician orders verified and plan of care in place.

## 2021-02-06 NOTE — NON-PROVIDER
[facility]    Date of Service: 2021    Chief Complaint  70 y.o. male admitted 2021 following a ground level fall with a PMHx of schizoaffective disorder, depression, anxiety, dyslipidemia, GERD, chronic back and right knee pain, ED, Mitral regurgitation, tobacco smoker and alcohol abuse. He arrived via EMS after falling down a small set of stairs. He reports hitting his head, denies loss of consciousness, HA or seizure activity. He had one other fall earlier this week. He denies any other hx of falls.     His head CT showed no intracranial abnormalities or hemorrhage. EKG showed SR rate of 80's with no ST segment changes. He was found to be hyponatremic with a Sodium of 126; Anion Gap 19; Blood EtOH <10.1; WBC 13.5. Urinalysis was + for ketones, small leukocytes and wbc's. Pt denies any dysuria, hematuria, or frequency    Socially - His wife  2 months ago, at this time he started to drink 1 pint of \"hard alcohol\" a day after her death. He stopped drinking 1 week ago because \"I needed to stop.\" He reports detoxing at home for 2 days with shaking, but now feels \"good\" and has no desire/craving for alcohol. He states he normally does not drink like this. He currently lives alone. His son lives 4 miles down the road and per ERP note, the son is concerned about his safety and his ability to care for himself. He currently does not drive and states he has not driven for over a year because he feels \"it isn't safe.\" He reports only eating cereal and soup and does not have a desire to cook at this time. Before his wife , they both cooked and he reports \"I am a very good cook.\" He does report getting enough sleep and showering daily. When conducting a medication reconciliation with him, he denies taking the medications that are listed on him home medication reconciliation list. He then states, \"I take pills/medications that are prescribed to me but I don't know the name of any of " "them.\"    Interval Problem Update  - His son has verbalized concerns that he may need assistance at home and is concerned about his safety. He personally does not feel that he needs assistance. PT/OT ordered.   - Pt unable to recollect the medications he is on at home. Call placed to [facility] on [address] ([phone]). Per pharmacy tech, pt has not filled any medications at this location for over a year. He did have a one time dose of Haldol and Naltrexone filled 2 months ago.   - Patient denies symptoms of cystitis at this time, will discontinue Keflex  - Significant drop in Potassium from 3.7 - 2.7. Will replace with PO Potassium.      Consultants/Specialty  PT/OT    Disposition  Transfer to medical floor      Review of Systems   Constitutional: Positive for malaise/fatigue. Negative for chills and fever.   HENT: Negative for congestion and hearing loss.    Eyes: Negative for blurred vision and double vision.   Respiratory: Negative for cough, shortness of breath and wheezing.    Cardiovascular: Negative for chest pain, palpitations and leg swelling.   Gastrointestinal: Negative for abdominal pain, constipation, diarrhea, heartburn, nausea and vomiting.   Genitourinary: Negative for dysuria, flank pain, frequency, hematuria and urgency.   Musculoskeletal: Positive for back pain. Negative for myalgias.        Chronic back and right knee pain   Skin: Negative for rash.   Neurological: Positive for weakness. Negative for dizziness, tingling and headaches.   Endo/Heme/Allergies: Does not bruise/bleed easily.   Psychiatric/Behavioral: Negative for depression and suicidal ideas. The patient is not nervous/anxious and does not have insomnia.       Physical Exam  Laboratory/Imaging   Hemodynamics  Temp (24hrs), Av.9 °C (96.7 °F), Min:35.7 °C (96.2 °F), Max:36.4 °C (97.5 °F)   Temperature: 36.4 °C (97.5 °F)  Pulse  Av  Min: 62  Max: 105    Blood Pressure : 105/67      Respiratory      Respiration: 18, " Pulse Oximetry: 97 %        Fluids    Intake/Output Summary (Last 24 hours) at 2/6/2021 0859  Last data filed at 2/6/2021 0116  Gross per 24 hour   Intake 1000 ml   Output --   Net 1000 ml       Nutrition  No orders of the defined types were placed in this encounter.    Physical Exam  Constitutional:       Appearance: Normal appearance. He is cachectic. He is ill-appearing.   HENT:      Head: Normocephalic and atraumatic.   Neck:      Musculoskeletal: Full passive range of motion without pain, normal range of motion and neck supple.   Cardiovascular:      Rate and Rhythm: Normal rate and regular rhythm.      Pulses: Normal pulses.      Heart sounds: Normal heart sounds.   Pulmonary:      Effort: Pulmonary effort is normal.      Breath sounds: Examination of the right-upper field reveals wheezing and rales. Examination of the left-upper field reveals wheezing and rales. Examination of the right-lower field reveals decreased breath sounds. Examination of the left-lower field reveals decreased breath sounds. Decreased breath sounds, wheezing and rales present.   Abdominal:      General: Bowel sounds are normal. There is no distension.      Palpations: Abdomen is soft.      Tenderness: There is no abdominal tenderness.   Skin:     General: Skin is warm and dry.      Capillary Refill: Capillary refill takes less than 2 seconds.      Coloration: Skin is pale.      Findings: Rash present. Rash is scaling.      Comments: Lower legs bilateral white dry scaly skin   Neurological:      General: No focal deficit present.      Mental Status: He is alert and oriented to person, place, and time.   Psychiatric:         Attention and Perception: Attention normal.         Mood and Affect: Mood is depressed. Affect is flat.         Behavior: Behavior is withdrawn. Behavior is cooperative.         Thought Content: Thought content does not include suicidal ideation. Thought content does not include suicidal plan.         Recent Labs      21  0823   WBC 13.5* 10.3   RBC 4.28* 3.51*   HEMOGLOBIN 14.3 11.7*   HEMATOCRIT 41.0* 33.5*   MCV 95.8 95.4   MCH 33.4* 33.3*   MCHC 34.9 34.9   RDW 51.9* 51.5*   PLATELETCT 290 218   MPV 10.0 9.3     Recent Labs     21   SODIUM 126*   POTASSIUM 3.7   CHLORIDE 83*   CO2 24   GLUCOSE 124*   BUN 23*   CREATININE 0.62   CALCIUM 9.5     Recent Labs     21   APTT 33.4   INR 0.96                  Assessment/Plan     Assessment & plan notes cannot be loaded without a specified hospital service.    Adult failure to thrive  Assessment & Plan  - His wife  2 months ago  - No drive to cook. Only eating soap and cereal  - Has not driven a car in over a year  - Family/son with concerns for his safety living alone  - Social Work Consult  - Patient desires to return to current living situation     Hyponatremia  Assessment & Plan  - Resolved after IV fluid hydration    Hypokalemia  Assessment & Plan  - Significant drop from 3.7 - 2.7   - Replacing    Dehydration  Assessment & Plan  - Significantly improved with IV fluids  - IV NS discontinued  - Tolerating PO hydration    Alcohol abuse- (present on admission)  Assessment & Plan  - Mg+, thiamine, and Folic Acid given in ER  - Watch for Detox  - Last drink 1 week ago  - Admit Blood Alcohol <10.1    Anxiety and Depression - (present on admission)  Assessment & Plan  - Unable to obtain accurate hx of current medications  - hx of being on trazodone, olanzapine, sertraline, and propranolol  - Not current suicidal ideations    Schizoaffective Disorder - (Present on admission)  Assessment & Plan  - Unable to obtain accurate hx of current medications  - hx of being on trazodone, olanzapine, sertraline, and propranolol    Tobacco Dependence - (Present on admission)  Assessment & Plan  - Reports smoking 4 cigarettes a day  - Denies the need for Nicotine patch at this time  - Continued smoking cessation/education  Quality-Core Measures

## 2021-02-06 NOTE — ED NOTES
Patient's son called for an update on the patient's status and to talk about patient's admission to hospital. Son did not answer, voice message left.

## 2021-02-06 NOTE — THERAPY
Occupational Therapy   Initial Evaluation     Patient Name: Boyd Askew  Age:  70 y.o., Sex:  male  Medical Record #: [id number]  Today's Date: 2/6/2021     Precautions  Precautions: Fall Risk    Assessment  Patient is 70 y.o. male with a diagnosis of failure to thrive and inability to care for self.  Additional factors influencing patient status / progress:Pt's spouse passed away 2 months ago and pt has been unable to effectively care for self since that time; hygiene is poor, not eating well, too weak to sit or stand for long and drinking to excess. Pt demonstrates a lack of cognition to realize that he is not taking care of self effectively. He also states that he has not had falls but then when asked about why he has a wound on his head states that he fell yesterday. Pt needs post acute placement to increase likelihood of success at home (which is his goal at this time). Pt will benefit from acute OT to increase independence in ADLS prior to d/c.     Plan    Recommend Occupational Therapy 3 times per week until therapy goals are met for the following treatments:  Adaptive Equipment, Cognitive Skill Development, Neuro Re-Education / Balance, Self Care/Activities of Daily Living, Therapeutic Activities and Therapeutic Exercises.    DC Equipment Recommendations: Front-Wheel Walker, Tub Transfer Bench  Discharge Recommendations: Recommend post-acute placement for additional occupational therapy services prior to discharge home        02/06/21 1339   Prior Living Situation   Prior Services Home-Independent;Intermittent Physical Support for ADL Per Family   Housing / Facility 2 Story Apartment / Condo   Steps Into Home 16   Steps In Home 0   Bathroom Set up Bathtub / Shower Combination   Equipment Owned None   Lives with - Patient's Self Care Capacity Alone and Unable to Care For Self   Comments Pt's spouse passed away 2 months ago. Since then having difficulty caring for self.    Prior Level of ADL Function    Self Feeding Independent   Grooming / Hygiene Requires Assist   Bathing Requires Assist   Dressing Requires Assist   Toileting Independent   Comments Pt did not have assistance for these activities but needed it.    Prior Level of IADL Function   Medication Management Requires Assist   Laundry Requires Assist   Kitchen Mobility Requires Assist   Finances Requires Assist   Home Management Requires Assist   Shopping Requires Assist   Prior Level Of Mobility Independent Without Device in Community;Supervision Without Device in Home  (Pt could benefit from AD for walking)   Driving / Transportation Walks   Comments Son lives close by; checks on pt on a regular basis and help with IADLs.    History of Falls   History of Falls Yes   Date of Last Fall 02/05/21   Precautions   Precautions Fall Risk   Cognition    Cognition / Consciousness X   Speech/ Communication Delayed Responses;Hard of Hearing   Level of Consciousness Alert   Ability To Follow Commands 1 Step   Safety Awareness Impaired;Impulsive   Sequencing Impaired   Initiation Impaired   Strength Upper Body   Upper Body Strength  X   Gross Strength Generalized Weakness, Equal Bilaterally.    Neurological Concerns   Neurological Concerns Yes   Sitting Posture During ADL's Posterior Lean   Standing Posture During ADL's Lateral Lean Left;Posterior Lean   Coordination Upper Body   Coordination WDL   Comments slowed   Balance Assessment   Sitting Balance (Static) Poor   Sitting Balance (Dynamic) Poor   Standing Balance (Static) Poor +   Standing Balance (Dynamic) Poor +   Weight Shift Sitting Fair   Weight Shift Standing Fair   Comments using FWW; fell back onto bed with standing; difficulty standing up from sitting; sequencing and balance difficulties   Bed Mobility    Supine to Sit Minimal Assist   Sit to Supine Supervised   Scooting Minimal Assist   ADL Assessment   Grooming Seated;Minimal Assist   Lower Body Dressing Moderate Assist  (able to don left sock; not  right; kept falling back onto bed)   Comments pants falling down; commenting on it but not pulling pants up.   Functional Mobility   Sit to Stand Minimal Assist   Bed, Chair, Wheelchair Transfer Minimal Assist   Transfer Method Stand Pivot   Comments using FWW   Activity Tolerance   Sitting Edge of Bed 10 min   Standing 5 min   Comments c/o fatigue with sitting and standing   Patient / Family Goals   Patient / Family Goal #1 Go home   Short Term Goals   Short Term Goal # 1 standing grooming at sink x 10 min without LOB at supervised level of assist    Short Term Goal # 2 LB dressing with AE prn at supervised level of assist   Problem List   Problem List Decreased Active Daily Living Skills;Decreased Homemaking Skills;Decreased Upper Extremity Strength Left;Decreased Upper Extremity Strength Right;Decreased Functional Mobility;Decreased Activity Tolerance;Safety Awareness Deficits / Cognition;Impaired Postural Control / Balance   Anticipated Discharge Equipment and Recommendations   DC Equipment Recommendations Front-Wheel Walker;Tub Transfer Bench   Discharge Recommendations Recommend post-acute placement for additional occupational therapy services prior to discharge home

## 2021-02-06 NOTE — ED TRIAGE NOTES
Chief Complaint   Patient presents with   • Failure to Thrive   • GLF     Patient presents by EMS from home. EMS reports patient's wife  two months ago now he lives alone. Son believes the patient is not eating and not able to care for himself anymore. Son is requesting SW to be involved. Patient had GLF tonight, - LOC. Abrasions noted to patient's left deneen and right elbow & hand. Patient reports he is a daily drinker but has not had a drink in one week. Patient A&O x 4. NAD noted.

## 2021-02-06 NOTE — H&P
Hospital Medicine History & Physical Note    Date of Service  2/6/2021    Primary Care Physician  Pcp Pt States None    Consultants  N/A    Code Status  Full Code    Chief Complaint  Chief Complaint   Patient presents with   • Failure to Thrive   • GLF       History of Presenting Illness  70 y.o. male with pmhx of psychiatric history, alcohol use in the past who presented 2/5/2021 after falling down the stairs and inability to take care of himself. He reports that he another fall 1 during the last week. Son is requesting  to be involved. CT head was done which ruled out any bleed. He denies any fever, chills, chest pain, abdominal pain or diarrhea. Vital signs were HR 98, RR 19, /99, T of 36.1*C and spO2 of 98%.     Review of Systems  Review of Systems   Musculoskeletal: Positive for falls.   All other systems reviewed and are negative.      Past Medical History   has a past medical history of Anxiety, Arthritis, Back pain, Breath shortness, Depression, GERD (gastroesophageal reflux disease), Heart burn, Indigestion, Mitral valve disorder, Other specified disorder of intestines, Psychiatric problem, Schizophrenia ([facility]), Stroke ([facility]), Torn ACL (anterior cruciate ligament) (left), Torn meniscus (2005), Torn meniscus (2000), and Venous insufficiency (left leg).    Surgical History   has a past surgical history that includes other orthopedic surgery; laminotomy (1996); knee arthroscopy (2/1/2010); medial meniscectomy (2/1/2010); meniscectomy (2/1/2010); rhinoplasty (3/15/2010); septoturbinoplasty (3/15/2010); sinusotomies (3/15/2010); ethmoidectomy (3/15/2010); recovery (8/4/2015); and gastroscopy with polypectomy (oct 2016).     Family History  family history includes Alcohol abuse in his father and mother; Depression in his mother; Stroke in his mother.     Social History   reports that he has been smoking cigarettes. He started smoking about 45 years ago. He has a 7.50 pack-year smoking history. He  has never used smokeless tobacco. He reports current alcohol use of about 7.2 oz of alcohol per week. He reports current drug use. Drugs: Inhaled and Marijuana.    Allergies  No Known Allergies    Medications  Prior to Admission Medications   Prescriptions Last Dose Informant Patient Reported? Taking?   atorvastatin (LIPITOR) 20 MG Tab Not Taking at Unknown time Patient No No   Sig: Take 1 Tab by mouth every bedtime.   Patient not taking: Reported on 2/5/2021   ibuprofen (MOTRIN) 600 MG Tab Not Taking at Unknown time Patient No No   Sig: TAKE ONE TABLET BY MOUTH EVERY 8 HOURS AS NEEDED FOR MODERATE PAIN.   Patient not taking: Reported on 2/5/2021   multivitamin (THERAGRAN) Tab 2/5/2021 at AM Patient Yes Yes   Sig: Take 1 Tab by mouth every day.   olanzapine (ZYPREXA) 15 MG tablet Not Taking at Unknown time Patient No No   Sig: Take 1 Tab by mouth every day.   Patient not taking: Reported on 2/5/2021   propranolol (INDERAL) 10 MG Tab Not Taking at Unknown time Patient No No   Sig: Take 1 Tab by mouth 2 times a day.   Patient not taking: Reported on 2/5/2021   sertraline (ZOLOFT) 100 MG Tab Not Taking at Unknown time Patient No No   Sig: Take 2 Tabs by mouth every day.   Patient not taking: Reported on 2/5/2021   traZODone (DESYREL) 50 MG Tab Not Taking at Unknown time Patient No No   Sig: TAKE ONE TABLET BY MOUTH AT BEDTIME   Patient not taking: Reported on 2/5/2021      Facility-Administered Medications: None       Physical Exam  Temp:  [36.1 °C (96.9 °F)] 36.1 °C (96.9 °F)  Pulse:  [98] 98  Resp:  [19] 19  BP: (138)/(99) 138/99  SpO2:  [98 %] 98 %    Physical Exam  Constitutional:       Appearance: Normal appearance.   HENT:      Head: Normocephalic.      Comments: Left forehead abrasions      Mouth/Throat:      Pharynx: Oropharynx is clear.   Eyes:      Extraocular Movements: Extraocular movements intact.      Pupils: Pupils are equal, round, and reactive to light.   Neck:      Musculoskeletal: Neck supple.    Cardiovascular:      Rate and Rhythm: Normal rate and regular rhythm.   Pulmonary:      Effort: Pulmonary effort is normal.      Breath sounds: Normal breath sounds.   Abdominal:      General: Bowel sounds are normal.      Palpations: Abdomen is soft.   Musculoskeletal: Normal range of motion.         General: No swelling or tenderness.   Skin:     General: Skin is warm and dry.   Neurological:      General: No focal deficit present.      Mental Status: He is alert and oriented to person, place, and time.   Psychiatric:         Mood and Affect: Mood normal.         Behavior: Behavior normal.         Laboratory:  Recent Labs     02/05/21 2121   WBC 13.5*   RBC 4.28*   HEMOGLOBIN 14.3   HEMATOCRIT 41.0*   MCV 95.8   MCH 33.4*   MCHC 34.9   RDW 51.9*   PLATELETCT 290   MPV 10.0     Recent Labs     02/05/21 2121   SODIUM 126*   POTASSIUM 3.7   CHLORIDE 83*   CO2 24   GLUCOSE 124*   BUN 23*   CREATININE 0.62   CALCIUM 9.5     Recent Labs     02/05/21 2121   ALTSGPT 21   ASTSGOT 24   ALKPHOSPHAT 93   TBILIRUBIN 1.0   GLUCOSE 124*     Recent Labs     02/05/21 2121   APTT 33.4   INR 0.96     No results for input(s): NTPROBNP in the last 72 hours.      No results for input(s): TROPONINT in the last 72 hours.    Imaging:  CT-HEAD W/O   Final Result      1.  No acute intracranial abnormality.   2.  Mild atrophy, along with chronic white matter lucencies in both cerebral hemispheres.               INTERPRETING LOCATION:  [address] [place] [address], [facility]            Assessment/Plan:  I anticipate this patient is appropriate for observation status at this time.    * Adult failure to thrive  Assessment & Plan   consult     Urinary tract infection  Assessment & Plan  Cultures  Continue with Keflex       Hyponatremia  Assessment & Plan  IVF NS at 84 ml/hr     Dehydration  Assessment & Plan  IVF Detox bag given   Continue with IVF NS at 84 ml/hr    Alcohol abuse- (present on admission)  Assessment & Plan  Thiamine and  folic acid   Last drink 1 week ago       VTE: Lovenox

## 2021-02-06 NOTE — ED NOTES
Med rec partial per interview with pt at bedside. Pt states that he is taking one medication in the morning and 2 at night but could not recall what they were. Pt home pharmacy is currently closed. Allergies reviewed. Pt denies abx use in the past 14 days.  d

## 2021-02-06 NOTE — PROGRESS NOTES
Yara from Lab called with critical result of potassium 2.7 at 0917. Critical lab result read back to Yara.   WALESKA Quigley notified of critical lab result at 0920.  Critical lab result read back by WALESKA Quigley.

## 2021-02-06 NOTE — THERAPY
Physical Therapy   Initial Evaluation     Patient Name: Boyd Askew  Age:  70 y.o., Sex:  male  Medical Record #: [id number]  Today's Date: 2/6/2021     Precautions: Fall Risk    Assessment  Patient is 70 y.o. male admitted after falling down stairs. Pt has a history of ETOH abuse but states he has stopped drinking.Pt resides alone in a 2nd story apartment with 16 steps to enter. Pt presents with impaired muscle strength and power and impaired sitting and standing balance. See below for status and goals. Recommend continued inpatient as well as post acute PT/      Plan    Recommend Physical Therapy 3 times per week until therapy goals are met for the following treatments:  Bed Mobility, Equipment, Gait Training, Neuro Re-Education / Balance, Stair Training, Therapeutic Activities and Therapeutic Exercises    DC Equipment Recommendations: Unable to determine at this time  Discharge Recommendations: Recommend post-acute placement for additional physical therapy services prior to discharge home(would be a good candidate for advancement to [facility].)       Objective       02/06/21 1341   Prior Living Situation   Prior Services None   Housing / Facility 2 Story Apartment / Condo   Steps Into Home 16   Steps In Home 0   Rail Both Rail (Steps into Home)   Equipment Owned None   Lives with - Patient's Self Care Capacity Alone and Unable to Care For Self   Prior Level of Functional Mobility   Bed Mobility Independent   Transfer Status Independent   Ambulation Independent   Assistive Devices Used None   Stairs Independent   History of Falls   History of Falls Yes   Date of Last Fall 02/05/21   Cognition    Speech/ Communication Delayed Responses   Level of Consciousness Alert   Ability To Follow Commands 1 Step   Safety Awareness Impaired   Comments poor insight into deficits    Balance Assessment   Sitting Balance (Static) Fair   Sitting Balance (Dynamic) Poor +   Standing Balance (Static) Fair   Standing Balance  (Dynamic) Poor +  (posterior lean )   Weight Shift Sitting Fair   Weight Shift Standing Fair   Comments list to left and posterior in standing.    Gait Analysis   Gait Level Of Assist Minimal Assist   Assistive Device Front Wheel Walker   Distance (Feet) 20   # of Times Distance was Traveled 1   Deviation Bradykinetic;Shuffled Gait;Decreased Heel Strike;Decreased Toe Off   # of Stairs Climbed 0   Weight Bearing Status no restrictions.    Comments assist for balance and walker guidance. cues for safety   Bed Mobility    Supine to Sit Supervised   Sit to Supine Minimal Assist   Scooting Minimal Assist   Rolling Minimum Assist to Lt.   Functional Mobility   Sit to Stand Minimal Assist  (cues for sequencing. )   Bed, Chair, Wheelchair Transfer Minimal Assist   Transfer Method Stand Step  (w/ FWW)   Comments slow,extended time and effort for mobility .   Activity Tolerance   Sitting Edge of Bed 8 min    Standing 3 min    Comments one LOB back onto bed in standing, reports fatigue.    Short Term Goals    Short Term Goal # 1 Pt will perform bed mobility with SPV by the 6thtx   Short Term Goal # 2 Pt will transfer with FWW with SPV by the 6th tx   Short Term Goal # 3 Pt will ambulate for 50 ft with FWW with SPV by the 6th tx   Short Term Goal # 4 Pt will ascend and descend steps x 15 with bilateral railing by the 6th tx   Problem List    Problems Safety Awareness Deficits / Cognition;Decreased Activity Tolerance;Impaired Balance;Functional Strength Deficit;Impaired Ambulation;Impaired Transfers;Impaired Bed Mobility

## 2021-02-06 NOTE — ED PROVIDER NOTES
ED Provider Note    CHIEF COMPLAINT  Chief Complaint   Patient presents with   • Failure to Thrive   • GLF       HPI  Boyd Askew is a 70 y.o. male who presents to the ED brought in by EMS for acute GLF's and failure to thrive. Per EMS, patient's wife  2 months ago and now he lives alone and is unable to properly care for himself. Patient's son is requesting for intervention by Social Work. The patient himself reports falling down the stairs earlier this evening. He hit his head, but denies any loss of consciousness. He also reports having 1 other fall prior this week. Patient has a history of alcohol abuse, but denies drinking within the last week. He denies any chest pain, shortness of breath, nausea, vomiting, lightheadedness, or leg swelling.      PPE Note: I personally donned full PPE for all patient encounters during this visit, including being clean-shaven with an N95 respirator mask, gloves, and goggles.    REVIEW OF SYSTEMS  See HPI for further details. All other systems are negative.     PAST MEDICAL HISTORY   has a past medical history of Anxiety, Arthritis, Back pain, Breath shortness, Depression, GERD (gastroesophageal reflux disease), Heart burn, Indigestion, Mitral valve disorder, Other specified disorder of intestines, Psychiatric problem, Schizophrenia (H[facility]), Stroke ([facility]), Torn ACL (anterior cruciate ligament) (left), Torn meniscus (), Torn meniscus (), and Venous insufficiency (left leg).    SOCIAL HISTORY  Social History     Tobacco Use   • Smoking status: Current Some Day Smoker     Packs/day: 0.15     Years: 50.00     Pack years: 7.50     Types: Cigarettes     Start date: 1975   • Smokeless tobacco: Never Used   Substance and Sexual Activity   • Alcohol use: Yes     Alcohol/week: 7.2 oz     Types: 12 Cans of beer per week     Comment: daily   • Drug use: Yes     Types: Inhaled, Marijuana     Comment: marijuana occasionaly   • Sexual activity: Yes     Partners: Male,  "Female       SURGICAL HISTORY   has a past surgical history that includes other orthopedic surgery; laminotomy (1996); knee arthroscopy (2/1/2010); medial meniscectomy (2/1/2010); meniscectomy (2/1/2010); rhinoplasty (3/15/2010); septoturbinoplasty (3/15/2010); sinusotomies (3/15/2010); ethmoidectomy (3/15/2010); recovery (8/4/2015); and gastroscopy with polypectomy (oct 2016).    CURRENT MEDICATIONS  Home Medications     Reviewed by Roxanne Acuña (Pharmacy Tech) on 02/05/21 at 2321  Med List Status: Partial   Medication Last Dose Status   atorvastatin (LIPITOR) 20 MG Tab Not Taking Active   ibuprofen (MOTRIN) 600 MG Tab Not Taking Active   olanzapine (ZYPREXA) 15 MG tablet Not Taking Active   propranolol (INDERAL) 10 MG Tab Not Taking Active   sertraline (ZOLOFT) 100 MG Tab Not Taking Active   traZODone (DESYREL) 50 MG Tab Not Taking Active                ALLERGIES  No Known Allergies    PHYSICAL EXAM  VITAL SIGNS: /99   Pulse 98   Temp 36.1 °C (96.9 °F) (Oral)   Resp 19   Ht 1.727 m (5' 8\")   Wt 81.6 kg (180 lb)   SpO2 98%   BMI 27.37 kg/m²   Pulse ox interpretation: I interpret this pulse ox as normal.  Genl: Elderly appearing, male sitting in chair comfortably, speaking clearly, appears in no acute distress   Head: Left sided scalp abrasions. Left zygoma abrasions. No facial tenderness. NC.   ENT: Mucous membranes moist, posterior pharynx clear, uvula midline, nares patent bilaterally   Eyes: Normal sclera, pupils equal round reactive to light  Neck: Supple, FROM, no LAD appreciated, no midline neck tenderness.  Pulmonary: Lungs are clear to auscultation bilaterally  Chest: No TTP  CV:  RRR, no murmur appreciated, pulses 2+ in both upper and lower extremities,  Abdomen: soft, NT/ND; no rebound/guarding, no masses palpated, no HSM   : no CVA or suprapubic tenderness   Musculoskeletal: Pain free ROM of the neck. Moving upper and lower extremities spontaneous in coordinated fashion  Neuro: " A&Ox4 (person, place, time, situation), speech fluent, gait steady, no focal deficits appreciated, No cerebellar signs  Sensation is grossly intact in the distal upper and lower extremities  5/5 strength in  and dorsiflexion/plantar flexion of the ankles  Psych: Patient has an appropriate affect and behavior  Skin: No rash or lesions.  No pallor or jaundice.  No cyanosis.  Warm and dry.     DIAGNOSTIC STUDIES / PROCEDURES    EKG  Results for orders placed or performed during the hospital encounter of 21   EKG (NOW)   Result Value Ref Range    Report       [facility]    Test Date:  2021  Pt Name:    BHARGAV VALENTIN           Department: ER  MRN:        [id number]                      Room:       [facility]  Gender:     Male                         Technician: [id number]  :        1950                   Requested By:RUBY STAPLES  Order #:    [id number]                    Reading MD: Michael Weathers MD    Measurements  Intervals                                Axis  Rate:       89                           P:          77  CA:         155                          QRS:        65  QRSD:       100                          T:          49  QT:         383  QTc:        467    Interpretive Statements  Sinus rhythm  Left atrial enlargement  Compared to ECG 02/15/2016 19:07:27  Sinus bradycardia no longer present  T-wave abnormality no longer present  Electronically Signed On 2021 23:14:33 PST by Michael Weathers MD           LABS  Labs Reviewed   CBC WITH DIFFERENTIAL - Abnormal; Notable for the following components:       Result Value    WBC 13.5 (*)     RBC 4.28 (*)     Hematocrit 41.0 (*)     MCH 33.4 (*)     RDW 51.9 (*)     Neutrophils-Polys 86.90 (*)     Lymphocytes 5.50 (*)     Neutrophils (Absolute) 11.76 (*)     Lymphs (Absolute) 0.75 (*)     Monos (Absolute) 0.88 (*)     Immature Granulocytes (abs) 0.12 (*)     All other components within normal limits    Narrative:      Indicate which anticoagulants the patient is on:->UNKNOWN   COMP METABOLIC PANEL - Abnormal; Notable for the following components:    Sodium 126 (*)     Chloride 83 (*)     Anion Gap 19.0 (*)     Glucose 124 (*)     Bun 23 (*)     Globulin 3.9 (*)     All other components within normal limits    Narrative:     Indicate which anticoagulants the patient is on:->UNKNOWN   URINALYSIS - Abnormal; Notable for the following components:    Color Orange (*)     Ketones 15 (*)     Bilirubin Large (*)     Nitrite Positive (*)     Leukocyte Esterase Small (*)     All other components within normal limits   URINE MICROSCOPIC (W/UA) - Abnormal; Notable for the following components:    WBC 0-2 (*)     RBC 0-2 (*)     All other components within normal limits   PROTHROMBIN TIME    Narrative:     Indicate which anticoagulants the patient is on:->UNKNOWN   APTT    Narrative:     Indicate which anticoagulants the patient is on:->UNKNOWN   DIAGNOSTIC ALCOHOL    Narrative:     Indicate which anticoagulants the patient is on:->UNKNOWN   SARS-COV-2, PCR (IN-HOUSE)    Narrative:     Have you been in close contact with a person who is suspected  or known to be positive for COVID-19 within the last 30 days  (e.g. last seen that person < 30 days ago)->No   ESTIMATED GFR    Narrative:     Indicate which anticoagulants the patient is on:->UNKNOWN       RADIOLOGY  CT-HEAD W/O   Final Result      1.  No acute intracranial abnormality.   2.  Mild atrophy, along with chronic white matter lucencies in both cerebral hemispheres.               INTERPRETING LOCATION:  [address] [place] [address], [facility]          COURSE & MEDICAL DECISION MAKING  Pertinent Labs & Imaging studies reviewed. (See chart for details)    DDX: Closed head injury, subdural hemorrhage, polysubstance abuse, dehydration, electrolyte disturbance, and dysrhythmia.     MDM    Initial evaluation at 9:33 PM:    12:57 AM I discussed the patient's case and the above findings with Dr. Delgado  (Hospitalist) who agrees to evaluate the patient for hospitalization.     Patient presents emergency room for symptoms as described above.  The patient is pleasant, demonstrates no focal neurological process, has no evidence of increased intracranial pressure but based on age and the mechanism and the concerns from his son regarding repeat falls and concern for possible subdurals or intracranial bleeds is low but CT of the head is obtained.  Lab work for the possibility of concurrent dehydration and other metabolic derangements is also obtained.  He has had decreased p.o. intake, has not been eating himself on a regular basis and the son is concerned regarding his debilitation following the death of his spouse.    Lab work shows a hyponatremia, slight elevated anion gap with reassuring sugars and CO2.  No concurrent LFT elevations or TORIE.  There is some scant WBCs and RBCs in the presence of nitrates in his urine which may indicate small amount of infectious etiology.  He remains afebrile with a very small leukocytosis.  Again there is not clinical evidence of sepsis or clinical findings to suggest progression to any surgical emergency, intra-abdominal infectious etiology or clinical signs of pyelonephritis however his stated dehydration warrants fluid resuscitation as he has a history of concurrent alcohol abuse he was given vitamin repletion and IV fluids and tolerates this well.  He is given single dose of oral Keflex, he will be admitted for hyponatremia, possible UTI and consultation with social work regarding possible placement as he is unable to care for himself at his current living facility.    HYDRATION: Based on the patient's presentation of Dehydration the patient was given IV fluids. IV Hydration was used because oral hydration was not adequate alone. Upon recheck following hydration, the patient was improved.    DISPOSITION:  Patient will be hospitalized by Dr. Delgado in guarded condition.    FINAL  IMPRESSION  Visit Diagnoses     ICD-10-CM   1. Weakness  R53.1   2. Fall, initial encounter  W19.XXXA   3. Hyponatremia  E87.1   4. Acute cystitis without hematuria  N30.00     Electronically signed by: Jasiel Rdoriguez M.D., 2/5/2021 9:36 PM

## 2021-02-06 NOTE — ASSESSMENT & PLAN NOTE
-History of alcohol dependence  -History of psychiatric disorder not currently taking any of his medications  -Wife recently passed away 2 months ago.  Patient states he used to cook regularly, but since her death he has been eating only soup and cereal  -Was drinking heavily and stopped cold turkey.  2 days of detox at home per patient.  Then sustained a fall due to BLE weakness  -PT OT  -I have ordered a walker  -Severe protein malnutrition  -Encourage p.o. intake  -3 times daily supplements  -Fall precautions  -Sleep hygiene  -Avoid narcotics, benzos and hypnotics

## 2021-02-06 NOTE — CARE PLAN
"Plan of care with patient and patient verbalized understanding of same.  Patient requires frequent repetition and explanation.  Patient answers \"yes\" abruptly to all questions.  Avoid use of yes/no answers to engage patient.  Patient educated on use of bed alarm and call bell and assistance with all OOB activity for safety and verbalized understanding of same.  No s/s ETOH withdrawal. Pt states he has not drank in one week.  Pt acknowledged eating less and poorly since wife's death.  IV fluids as ordered, SCDs on patient and use explained.  Patient states he has lost weight of recent but unsure how much.  Patient asked if he felt he is able to take care of himself and where he is going after hospital stay.  Pt states he is able to care for slef and will  return home to his second floor apartment.    Problem: Safety  Goal: Will remain free from injury  Outcome: PROGRESSING AS EXPECTED     Problem: Safety  Goal: Will remain free from falls  Outcome: PROGRESSING AS EXPECTED     Problem: Venous Thromboembolism (VTW)/Deep Vein Thrombosis (DVT) Prevention:  Goal: Patient will participate in Venous Thrombosis (VTE)/Deep Vein Thrombosis (DVT)Prevention Measures  Outcome: PROGRESSING AS EXPECTED  Problem: Skin Integrity  Goal: Risk for impaired skin integrity will decrease  Outcome: PROGRESSING AS EXPECTED     Problem: Knowledge Deficit  Goal: Knowledge of disease process/condition, treatment plan, diagnostic tests, and medications will improve  Outcome: PROGRESSING AS EXPECTED     Problem: Discharge Barriers/Planning  Goal: Patient's continuum of care needs will be met  Outcome: PROGRESSING AS EXPECTED     Problem: Alcohol Withdrawal  Goal: Optimal Outcome for the Alcohol Withdrawal Patient  Outcome: PROGRESSING AS EXPECTED              "

## 2021-02-06 NOTE — PROGRESS NOTES
Mr. Askew is a 70-year-old male with PMH significant for alcohol dependence, anxiety and depression with previous suicidal ideation and schizoaffective disorder who was admitted earlier this morning after a fall.  His wife  2 months ago and since then he has been drinking about 1 pint of alcohol per day.  About 1 week ago he completely stopped.  He reports 2 days of shaking symptoms, but denied seizures. He apparently fell down a small set (about 3) stairs at home.  Son lives 4 miles down the road and is concerned about his safety and ability to care for himself.  Apparently he has 16 stairs to get up to his apartment.     Head CT showed no acute abnormalities.  EKG showed sinus rhythm with no ST segment changes.  Labs showed hyponatremia with sodium 126.  Diagnostic blood alcohol undetected.  Leukocytosis with WBC 13.5.  UA positive for ketones, small leuks and WBCs.  Patient denies any dysuria, urgency, frequency or hematuria and repeat labs this morning show resolution of leukocytosis, therefore I will DC the Keflex that was ordered on admission.    Hypokalemia -2.7 -replacing.  Mag 2.1.  Phos WNL    Seen and examined.  Patient is quite stoic with one-word answers.  Per chart review, home meds include: Propranolol, sertraline, olanzapine, trazodone.  Patient reports compliance with all of his medications, however when we contacted the pharmacy they reported patient has not picked up any of those prescriptions in a year.     consult -son is very concerned for patient being able to care for himself.  Patient wants to return to his current living situation.  Will wait for PT/OT evaluation for recommendations.      BERTRAM Díaz.

## 2021-02-06 NOTE — DIETARY
"Nutrition Services: Day 1 of admit. Boyd Askew is a 70 y.o. male with admitting DX of failure to thrive in adult, hyponatremia.  Consult received for 2-13 lb weight loss x 3 months, poor appetite, failure to thrive dx.     Pt reports losing 20 lbs over the past 3 months. Usual body weight is 200 lbs and he recalls being that weight \"a few months ago.\" Pt stated he lost weight since he stopped eating. At baseline, pt verbalized consumed 2 medium meals. Pt stated he was consuming 2 medium meals prior to admission. This RD asked pt to clarify if he was not eating or eating consistent with baseline. Pt then said he was eating consistent with baseline. Question accuracy of pt report of PO intake prior to admission.     Pt denies recent physical changes related to fat and muscle. Nutrition focused physical exam performed. Pt noted with hollowing at temples and sunken eyes. These findings are likely age related-sacropenia. However, dorsal hand and bicep appeared when nourished. Unable to assess lower body since pt was refusing to remove jeans and has SCDs in place.    Assessment:  Ht: 177.8 cm, Wt : 76 kg (167 lb 8.8 oz) via stand up scale, BMI: 24.04 - normal  Diet/Intake: regular - Per chart no pt PO recorded at this time     Evaluation:   1. History of psychiatric history and alcohol use.   2. \"EMS reports patient's wife  two months ago now he lives alone. Son believes the patient is not eating...Patient reports he is a daily drinker but has not had a drink in one week.\" per RD RN note.  3. 10% weight loss x 3 months per pt reports (severe).   4. Labs: sodium 130, potassium 2.7, chloride 93  5. APRN texted to suggest repletion of potassium via MAR.   6. Last BM: no data - pericolace given today    Malnutrition Risk: Pt is at-risk due to severe weight loss. However, no criteria met at this time.     Recommendations/Plan:   1. Encourage intake of meals.  2. Document intake of all meals as % taken in " ADL's to provide interdisciplinary communication across all shifts.   3. Monitor weight.  4. Nutrition rep will continue to see patient for ongoing meal and snack preferences.  5. Obtain supplement order per RD as needed.    RD following.

## 2021-02-07 PROBLEM — N39.0 URINARY TRACT INFECTION: Status: RESOLVED | Noted: 2021-02-06 | Resolved: 2021-02-07

## 2021-02-07 PROBLEM — E87.6 HYPOKALEMIA: Status: ACTIVE | Noted: 2021-02-07

## 2021-02-07 PROBLEM — E87.1 HYPONATREMIA: Status: RESOLVED | Noted: 2021-02-06 | Resolved: 2021-02-07

## 2021-02-07 PROBLEM — E86.0 DEHYDRATION: Status: RESOLVED | Noted: 2021-02-06 | Resolved: 2021-02-07

## 2021-02-07 PROBLEM — R54 AGE-RELATED PHYSICAL DEBILITY: Status: ACTIVE | Noted: 2021-02-06

## 2021-02-07 LAB
ANION GAP SERPL CALC-SCNC: 10 MMOL/L (ref 7–16)
BUN SERPL-MCNC: 17 MG/DL (ref 8–22)
CALCIUM SERPL-MCNC: 9 MG/DL (ref 8.5–10.5)
CHLORIDE SERPL-SCNC: 98 MMOL/L (ref 96–112)
CO2 SERPL-SCNC: 27 MMOL/L (ref 20–33)
CREAT SERPL-MCNC: 0.68 MG/DL (ref 0.5–1.4)
GLUCOSE SERPL-MCNC: 96 MG/DL (ref 65–99)
MAGNESIUM SERPL-MCNC: 2 MG/DL (ref 1.5–2.5)
POTASSIUM SERPL-SCNC: 4.2 MMOL/L (ref 3.6–5.5)
SODIUM SERPL-SCNC: 135 MMOL/L (ref 135–145)

## 2021-02-07 PROCEDURE — 770001 HCHG ROOM/CARE - MED/SURG/GYN PRIV*

## 2021-02-07 PROCEDURE — 83735 ASSAY OF MAGNESIUM: CPT

## 2021-02-07 PROCEDURE — 80048 BASIC METABOLIC PNL TOTAL CA: CPT

## 2021-02-07 PROCEDURE — 99231 SBSQ HOSP IP/OBS SF/LOW 25: CPT | Mod: GC | Performed by: FAMILY MEDICINE

## 2021-02-07 PROCEDURE — 700102 HCHG RX REV CODE 250 W/ 637 OVERRIDE(OP): Performed by: STUDENT IN AN ORGANIZED HEALTH CARE EDUCATION/TRAINING PROGRAM

## 2021-02-07 PROCEDURE — A9270 NON-COVERED ITEM OR SERVICE: HCPCS | Performed by: STUDENT IN AN ORGANIZED HEALTH CARE EDUCATION/TRAINING PROGRAM

## 2021-02-07 PROCEDURE — 700111 HCHG RX REV CODE 636 W/ 250 OVERRIDE (IP): Performed by: NURSE PRACTITIONER

## 2021-02-07 PROCEDURE — 700102 HCHG RX REV CODE 250 W/ 637 OVERRIDE(OP): Performed by: NURSE PRACTITIONER

## 2021-02-07 PROCEDURE — A9270 NON-COVERED ITEM OR SERVICE: HCPCS | Performed by: NURSE PRACTITIONER

## 2021-02-07 PROCEDURE — 700111 HCHG RX REV CODE 636 W/ 250 OVERRIDE (IP): Performed by: STUDENT IN AN ORGANIZED HEALTH CARE EDUCATION/TRAINING PROGRAM

## 2021-02-07 RX ORDER — FOLIC ACID 1 MG/1
1 TABLET ORAL DAILY
Status: DISCONTINUED | OUTPATIENT
Start: 2021-02-07 | End: 2021-02-08 | Stop reason: HOSPADM

## 2021-02-07 RX ADMIN — ENOXAPARIN SODIUM 40 MG: 40 INJECTION SUBCUTANEOUS at 05:26

## 2021-02-07 RX ADMIN — DOCUSATE SODIUM 50 MG AND SENNOSIDES 8.6 MG 2 TABLET: 8.6; 5 TABLET, FILM COATED ORAL at 05:26

## 2021-02-07 RX ADMIN — THERA TABS 1 TABLET: TAB at 10:51

## 2021-02-07 RX ADMIN — FOLIC ACID 1 MG: 1 TABLET ORAL at 10:51

## 2021-02-07 RX ADMIN — THIAMINE HYDROCHLORIDE 100 MG: 100 INJECTION, SOLUTION INTRAMUSCULAR; INTRAVENOUS at 10:51

## 2021-02-07 ASSESSMENT — ENCOUNTER SYMPTOMS
COUGH: 0
NAUSEA: 0
NERVOUS/ANXIOUS: 0
SHORTNESS OF BREATH: 0
PALPITATIONS: 0
FALLS: 1
VOMITING: 0
WEAKNESS: 1
FEVER: 0
CHILLS: 0

## 2021-02-07 ASSESSMENT — PATIENT HEALTH QUESTIONNAIRE - PHQ9
1. LITTLE INTEREST OR PLEASURE IN DOING THINGS: SEVERAL DAYS
3. TROUBLE FALLING OR STAYING ASLEEP OR SLEEPING TOO MUCH: SEVERAL DAYS
4. FEELING TIRED OR HAVING LITTLE ENERGY: SEVERAL DAYS
SUM OF ALL RESPONSES TO PHQ9 QUESTIONS 1 AND 2: 2
7. TROUBLE CONCENTRATING ON THINGS, SUCH AS READING THE NEWSPAPER OR WATCHING TELEVISION: SEVERAL DAYS
5. POOR APPETITE OR OVEREATING: SEVERAL DAYS
6. FEELING BAD ABOUT YOURSELF - OR THAT YOU ARE A FAILURE OR HAVE LET YOURSELF OR YOUR FAMILY DOWN: NOT AL ALL
SUM OF ALL RESPONSES TO PHQ QUESTIONS 1-9: 7
2. FEELING DOWN, DEPRESSED, IRRITABLE, OR HOPELESS: SEVERAL DAYS
9. THOUGHTS THAT YOU WOULD BE BETTER OFF DEAD, OR OF HURTING YOURSELF: NOT AT ALL
8. MOVING OR SPEAKING SO SLOWLY THAT OTHER PEOPLE COULD HAVE NOTICED. OR THE OPPOSITE, BEING SO FIGETY OR RESTLESS THAT YOU HAVE BEEN MOVING AROUND A LOT MORE THAN USUAL: SEVERAL DAYS

## 2021-02-07 ASSESSMENT — PAIN DESCRIPTION - PAIN TYPE: TYPE: ACUTE PAIN

## 2021-02-07 NOTE — PROGRESS NOTES
Assessment completed. Pt A&Ox 4. Respirations are even and unlabored on 2L n/c. Pt denies pain at this time. Monitors applied, VS stable, call light and belongings within reach. POC updated (Gisella). Pt educated on room and call light, pt verbalized understanding. Communication board updated. Needs met.

## 2021-02-07 NOTE — PROGRESS NOTES
Hospital Medicine Daily Progress Note    Date of Service  2021    Chief Complaint  BLE weakness and Fall    Hospital Course  Mr. Askew is a 70-year-old male with PMH significant for alcohol dependence, anxiety and depression with previous suicidal ideation and schizoaffective disorder who was admitted earlier this morning after a fall.  His wife  2 months ago and since then he has been drinking about 1 pint of alcohol per day.  About 1 week ago he completely stopped.  He reports 2 days of shaking symptoms, but denied seizures. He apparently fell down a small set (about 3) stairs at home.  Son lives 4 miles down the road and is concerned about his safety and ability to care for himself.  Apparently he has 16 stairs to get up to his apartment.      Head CT showed no acute abnormalities.  EKG showed sinus rhythm with no ST segment changes.  Labs showed hyponatremia with sodium 126.  Diagnostic blood alcohol undetected.  Leukocytosis with WBC 13.5.  UA positive for ketones, small leuks and WBCs.  Patient denies any dysuria, urgency, frequency or hematuria and repeat labs this morning show resolution of leukocytosis, ABX Dc'd.    Interval Problem Update  -PT/OT recommending placement.  Now patient's son and daughter no longer wish to pursue placement and plan to take patient to live with his daughter in [facility].  Son needs one more day to get everything set up here locally and will come  his dad on .  -FWW and tub chair DME ordered per PT/OT Rec's.  -no events on tele. K normalized. DC tele    Consultants/Specialty  None    Code Status  Full Code    Disposition  DC tomorrow    Review of Systems  Review of Systems   Constitutional: Positive for malaise/fatigue. Negative for chills and fever.   HENT: Negative for hearing loss and tinnitus.    Respiratory: Negative for cough and shortness of breath.    Cardiovascular: Negative for chest pain and palpitations.   Gastrointestinal: Negative for nausea and  vomiting.   Genitourinary: Negative for dysuria and urgency.   Musculoskeletal: Positive for falls.   Skin: Negative for rash.   Neurological: Positive for weakness.   Psychiatric/Behavioral: Negative for suicidal ideas. The patient is not nervous/anxious.    All other systems reviewed and are negative.       Physical Exam  Temp:  [36.1 °C (97 °F)-36.8 °C (98.2 °F)] 36.8 °C (98.2 °F)  Pulse:  [] 83  Resp:  [9-23] 18  BP: ()/(61-81) 90/62  SpO2:  [92 %-98 %] 98 %    Physical Exam  Vitals signs and nursing note reviewed.   Constitutional:       General: He is not in acute distress.     Appearance: He is ill-appearing.      Comments: Unkempt   HENT:      Head: Normocephalic.      Right Ear: Hearing normal.      Left Ear: Hearing normal.      Nose: Nose normal.      Mouth/Throat:      Lips: Pink.      Mouth: Mucous membranes are moist.   Cardiovascular:      Rate and Rhythm: Normal rate.      Pulses: Normal pulses.      Heart sounds: Normal heart sounds.   Pulmonary:      Effort: Pulmonary effort is normal.      Breath sounds: Normal breath sounds. No wheezing.   Abdominal:      General: Bowel sounds are normal.      Palpations: Abdomen is soft.      Tenderness: There is no abdominal tenderness. There is no guarding or rebound.   Genitourinary:     Comments: Voiding  Musculoskeletal: Normal range of motion.      Right lower leg: No edema.      Left lower leg: No edema.      Comments: Generalized Weakness   Skin:     General: Skin is warm and dry.      Capillary Refill: Capillary refill takes 2 to 3 seconds.   Neurological:      General: No focal deficit present.      Mental Status: He is alert and oriented to person, place, and time.      Motor: Motor function is intact.      Coordination: Coordination is intact.   Psychiatric:         Mood and Affect: Affect is blunt and flat.         Behavior: Behavior is cooperative.         Cognition and Memory: Cognition normal.         Judgment: Judgment normal.          Fluids    Intake/Output Summary (Last 24 hours) at 2/7/2021 1004  Last data filed at 2/7/2021 0317  Gross per 24 hour   Intake 240 ml   Output 900 ml   Net -660 ml       Laboratory  Recent Labs     02/05/21 2121 02/06/21 0823   WBC 13.5* 10.3   RBC 4.28* 3.51*   HEMOGLOBIN 14.3 11.7*   HEMATOCRIT 41.0* 33.5*   MCV 95.8 95.4   MCH 33.4* 33.3*   MCHC 34.9 34.9   RDW 51.9* 51.5*   PLATELETCT 290 218   MPV 10.0 9.3     Recent Labs     02/05/21 2121 02/06/21  0410 02/06/21 0823 02/07/21  0332   SODIUM 126*  --  130* 135   POTASSIUM 3.7 2.7* 2.7* 4.2   CHLORIDE 83*  --  93* 98   CO2 24  --  24 27   GLUCOSE 124*  --  78 96   BUN 23*  --  15 17   CREATININE 0.62  --  0.41* 0.68   CALCIUM 9.5  --  8.4* 9.0     Recent Labs     02/05/21 2121   APTT 33.4   INR 0.96               Imaging  CT-HEAD W/O   Final Result      1.  No acute intracranial abnormality.   2.  Mild atrophy, along with chronic white matter lucencies in both cerebral hemispheres.               INTERPRETING LOCATION:  [address] [place] [address], [id number]           Assessment/Plan  * Age-related physical debility- (present on admission)  Assessment & Plan  -History of alcohol dependence  -History of psychiatric disorder not currently taking any of his medications  -Wife recently passed away 2 months ago.  Patient states he used to cook regularly, but since her death he has been eating only soup and cereal  -Was drinking heavily and stopped cold turkey.  2 days of detox at home per patient.  Then sustained a fall due to BLE weakness  -PT OT  -I have ordered a walker  -Severe protein malnutrition  -Encourage p.o. intake  -3 times daily supplements  -Fall precautions  -Sleep hygiene  -Avoid narcotics, benzos and hypnotics      Hypokalemia- (present on admission)  Assessment & Plan  -resolved after replacement      Severe protein-calorie malnutrition (HCC)- (present on admission)  Assessment & Plan  -Body mass index is 24.04 kg/m².  -Dietary consult  -TID  supplements      Grieving- (present on admission)  Assessment & Plan  -From wife's passing 2 months ago  -Patient's with minimal verbal interaction with staff  -Flat affect.  He denies suicidal ideations  -Outpatient psychotherapy    Alcohol dependence (HCC)- (present on admission)  Assessment & Plan  -Quit cold turkey 1 week prior to presentation  -Continue daily thiamine, folic acid and MVI -IV thiamine while hospitalized, p.o. at DC  -Seizure precautions  -Ongoing alcohol cessation education and counseling           VTE prophylaxis: Enoxaparin    Please note that this dictation was created using voice recognition software. I have made every reasonable attempt to correct obvious errors, but there may be errors of grammar and possibly content that I did not discover before finalizing the note.    I have performed a physical exam and reviewed and updated ROS and Assessment/Plan today (02/07/21). In review of the previous note there are no changes except as documented above    BERTRAM Díaz.

## 2021-02-07 NOTE — PROGRESS NOTES
Patient maintained on telemetry monitoring per physician order.   Telemetry review and interpretation as follows:    Telemetry:  Sinus Rhythm / Sinus Arrythmia  Ectopy:  PAC with more frequent PVC  Rate Range:  77-93  Measurements:  0.13/0.11/0.38

## 2021-02-07 NOTE — HOSPITAL COURSE
Mr. Askew is a 70-year-old male with PMH significant for alcohol dependence, anxiety and depression with previous suicidal ideation and schizoaffective disorder who was admitted earlier this morning after a fall.  His wife  2 months ago and since then he has been drinking about 1 pint of alcohol per day.  About 1 week ago he completely stopped.  He reports 2 days of shaking symptoms, but denied seizures. He apparently fell down a small set (about 3) stairs at home.  Son lives 4 miles down the road and is concerned about his safety and ability to care for himself.  Apparently he has 16 stairs to get up to his apartment.      Head CT showed no acute abnormalities.  EKG showed sinus rhythm with no ST segment changes.  Labs showed hyponatremia with sodium 126.  Diagnostic blood alcohol undetected.  Leukocytosis with WBC 13.5.  UA positive for ketones, small leuks and WBCs.  Patient denies any dysuria, urgency, frequency or hematuria and repeat labs this morning show resolution of leukocytosis, ABX Dc'd.

## 2021-02-07 NOTE — CARE PLAN
Problem: Communication  Goal: The ability to communicate needs accurately and effectively will improve  Outcome: PROGRESSING AS EXPECTED     Problem: Safety  Goal: Will remain free from injury  Outcome: PROGRESSING AS EXPECTED  Goal: Will remain free from falls  Outcome: PROGRESSING AS EXPECTED     Problem: Infection  Goal: Will remain free from infection  Outcome: PROGRESSING AS EXPECTED     Problem: Venous Thromboembolism (VTW)/Deep Vein Thrombosis (DVT) Prevention:  Goal: Patient will participate in Venous Thrombosis (VTE)/Deep Vein Thrombosis (DVT)Prevention Measures  Outcome: PROGRESSING AS EXPECTED     Problem: Psychosocial Needs:  Goal: Level of anxiety will decrease  Outcome: PROGRESSING AS EXPECTED     Problem: Fluid Volume:  Goal: Will maintain balanced intake and output  Outcome: PROGRESSING AS EXPECTED     Problem: Respiratory:  Goal: Respiratory status will improve  Outcome: PROGRESSING AS EXPECTED     Problem: Bowel/Gastric:  Goal: Will not experience complications related to bowel motility  Outcome: PROGRESSING AS EXPECTED     Problem: Skin Integrity  Goal: Risk for impaired skin integrity will decrease  Outcome: PROGRESSING AS EXPECTED     Problem: Mobility  Goal: Risk for activity intolerance will decrease  Outcome: PROGRESSING AS EXPECTED     Problem: Medication  Goal: Compliance with prescribed medication will improve  Outcome: PROGRESSING AS EXPECTED

## 2021-02-07 NOTE — ASSESSMENT & PLAN NOTE
-From wife's passing 2 months ago  -Patient's with minimal verbal interaction with staff  -Flat affect.  He denies suicidal ideations  -Outpatient psychotherapy

## 2021-02-07 NOTE — CARE PLAN
Plan of care with patient and patient verbalized understanding of same. Pt flat affect, fatigue, needs encouraged to interact but does.  Pt reported not to have voided since admission, bladder soft, no c/o pain.  Turgor dry, taut, mild tenting of forehead; PO intake encouraged with minimal success.      Problem: Safety  Goal: Will remain free from injury  Outcome: PROGRESSING AS EXPECTED     Problem: Safety  Goal: Will remain free from falls  Outcome: PROGRESSING AS EXPECTED     Problem: Psychosocial Needs:  Goal: Level of anxiety will decrease  Outcome: PROGRESSING SLOWER THAN EXPECTED     Problem: Fluid Volume:  Goal: Will maintain balanced intake and output  Outcome: PROGRESSING SLOWER THAN EXPECTED     Problem: Mobility  Goal: Risk for activity intolerance will decrease  Outcome: PROGRESSING SLOWER THAN EXPECTED     Problem: Nutrition Deficit  Goal: Adequate Food and Fluid Intake  Outcome: PROGRESSING SLOWER THAN EXPECTED

## 2021-02-07 NOTE — PROGRESS NOTES
Received report from previous shift RN and assumed care of patient at 1915.  Pt is resting comfortably without complaint at this time.   Call bell in reach.  Patient shift assessment as written in flowsheet.  Ongoing care will be provided per orders and plan of care with education and reinforcement of same.

## 2021-02-08 VITALS
WEIGHT: 167.55 LBS | RESPIRATION RATE: 20 BRPM | BODY MASS INDEX: 23.99 KG/M2 | HEIGHT: 70 IN | DIASTOLIC BLOOD PRESSURE: 60 MMHG | HEART RATE: 78 BPM | TEMPERATURE: 98.1 F | OXYGEN SATURATION: 100 % | SYSTOLIC BLOOD PRESSURE: 98 MMHG

## 2021-02-08 PROBLEM — E87.6 HYPOKALEMIA: Status: RESOLVED | Noted: 2021-02-07 | Resolved: 2021-02-08

## 2021-02-08 LAB
ANION GAP SERPL CALC-SCNC: 8 MMOL/L (ref 7–16)
BASOPHILS # BLD AUTO: 1 % (ref 0–1.8)
BASOPHILS # BLD: 0.1 K/UL (ref 0–0.12)
BUN SERPL-MCNC: 21 MG/DL (ref 8–22)
CALCIUM SERPL-MCNC: 8.7 MG/DL (ref 8.5–10.5)
CHLORIDE SERPL-SCNC: 99 MMOL/L (ref 96–112)
CO2 SERPL-SCNC: 26 MMOL/L (ref 20–33)
CREAT SERPL-MCNC: 0.61 MG/DL (ref 0.5–1.4)
EOSINOPHIL # BLD AUTO: 0.2 K/UL (ref 0–0.51)
EOSINOPHIL NFR BLD: 1.9 % (ref 0–6.9)
ERYTHROCYTE [DISTWIDTH] IN BLOOD BY AUTOMATED COUNT: 54.5 FL (ref 35.9–50)
GLUCOSE SERPL-MCNC: 107 MG/DL (ref 65–99)
HCT VFR BLD AUTO: 33.2 % (ref 42–52)
HGB BLD-MCNC: 11.1 G/DL (ref 14–18)
IMM GRANULOCYTES # BLD AUTO: 0.25 K/UL (ref 0–0.11)
IMM GRANULOCYTES NFR BLD AUTO: 2.4 % (ref 0–0.9)
LYMPHOCYTES # BLD AUTO: 2.76 K/UL (ref 1–4.8)
LYMPHOCYTES NFR BLD: 26.3 % (ref 22–41)
MAGNESIUM SERPL-MCNC: 1.8 MG/DL (ref 1.5–2.5)
MCH RBC QN AUTO: 33 PG (ref 27–33)
MCHC RBC AUTO-ENTMCNC: 33.4 G/DL (ref 33.7–35.3)
MCV RBC AUTO: 98.8 FL (ref 81.4–97.8)
MONOCYTES # BLD AUTO: 0.9 K/UL (ref 0–0.85)
MONOCYTES NFR BLD AUTO: 8.6 % (ref 0–13.4)
NEUTROPHILS # BLD AUTO: 6.29 K/UL (ref 1.82–7.42)
NEUTROPHILS NFR BLD: 59.8 % (ref 44–72)
NRBC # BLD AUTO: 0.04 K/UL
NRBC BLD-RTO: 0.4 /100 WBC
PLATELET # BLD AUTO: 231 K/UL (ref 164–446)
PMV BLD AUTO: 9.9 FL (ref 9–12.9)
POTASSIUM SERPL-SCNC: 3.9 MMOL/L (ref 3.6–5.5)
RBC # BLD AUTO: 3.36 M/UL (ref 4.7–6.1)
SODIUM SERPL-SCNC: 133 MMOL/L (ref 135–145)
WBC # BLD AUTO: 10.5 K/UL (ref 4.8–10.8)

## 2021-02-08 PROCEDURE — 85025 COMPLETE CBC W/AUTO DIFF WBC: CPT

## 2021-02-08 PROCEDURE — 83735 ASSAY OF MAGNESIUM: CPT

## 2021-02-08 PROCEDURE — 700102 HCHG RX REV CODE 250 W/ 637 OVERRIDE(OP): Performed by: STUDENT IN AN ORGANIZED HEALTH CARE EDUCATION/TRAINING PROGRAM

## 2021-02-08 PROCEDURE — 700111 HCHG RX REV CODE 636 W/ 250 OVERRIDE (IP): Performed by: NURSE PRACTITIONER

## 2021-02-08 PROCEDURE — 99239 HOSP IP/OBS DSCHRG MGMT >30: CPT | Mod: GC | Performed by: FAMILY MEDICINE

## 2021-02-08 PROCEDURE — A9270 NON-COVERED ITEM OR SERVICE: HCPCS | Performed by: STUDENT IN AN ORGANIZED HEALTH CARE EDUCATION/TRAINING PROGRAM

## 2021-02-08 PROCEDURE — A9270 NON-COVERED ITEM OR SERVICE: HCPCS | Performed by: NURSE PRACTITIONER

## 2021-02-08 PROCEDURE — 700102 HCHG RX REV CODE 250 W/ 637 OVERRIDE(OP): Performed by: NURSE PRACTITIONER

## 2021-02-08 PROCEDURE — 700111 HCHG RX REV CODE 636 W/ 250 OVERRIDE (IP): Performed by: STUDENT IN AN ORGANIZED HEALTH CARE EDUCATION/TRAINING PROGRAM

## 2021-02-08 PROCEDURE — 80048 BASIC METABOLIC PNL TOTAL CA: CPT

## 2021-02-08 RX ORDER — ATORVASTATIN CALCIUM 20 MG/1
20 TABLET, FILM COATED ORAL
Qty: 30 TAB | Refills: 2 | Status: SHIPPED | OUTPATIENT
Start: 2021-02-08

## 2021-02-08 RX ORDER — LANOLIN ALCOHOL/MO/W.PET/CERES
100 CREAM (GRAM) TOPICAL DAILY
Qty: 30 TAB | Refills: 0 | Status: SHIPPED | OUTPATIENT
Start: 2021-02-08

## 2021-02-08 RX ORDER — SERTRALINE HYDROCHLORIDE 100 MG/1
200 TABLET, FILM COATED ORAL DAILY
Qty: 60 TAB | Refills: 2 | Status: SHIPPED | OUTPATIENT
Start: 2021-02-08

## 2021-02-08 RX ORDER — ASPIRIN 81 MG/1
81 TABLET ORAL
Qty: 30 TAB | Refills: 0 | COMMUNITY
Start: 2021-02-08

## 2021-02-08 RX ADMIN — FOLIC ACID 1 MG: 1 TABLET ORAL at 05:24

## 2021-02-08 RX ADMIN — ENOXAPARIN SODIUM 40 MG: 40 INJECTION SUBCUTANEOUS at 05:25

## 2021-02-08 RX ADMIN — DOCUSATE SODIUM 50 MG AND SENNOSIDES 8.6 MG 2 TABLET: 8.6; 5 TABLET, FILM COATED ORAL at 05:25

## 2021-02-08 RX ADMIN — THERA TABS 1 TABLET: TAB at 05:25

## 2021-02-08 RX ADMIN — THIAMINE HYDROCHLORIDE 100 MG: 100 INJECTION, SOLUTION INTRAMUSCULAR; INTRAVENOUS at 05:25

## 2021-02-08 ASSESSMENT — PAIN DESCRIPTION - PAIN TYPE: TYPE: ACUTE PAIN

## 2021-02-08 NOTE — CARE PLAN
Problem: Communication  Goal: The ability to communicate needs accurately and effectively will improve  Outcome: PROGRESSING AS EXPECTED     Problem: Safety  Goal: Will remain free from injury  Outcome: PROGRESSING AS EXPECTED  Goal: Will remain free from falls  Outcome: PROGRESSING AS EXPECTED     Problem: Infection  Goal: Will remain free from infection  Outcome: PROGRESSING AS EXPECTED     Problem: Venous Thromboembolism (VTW)/Deep Vein Thrombosis (DVT) Prevention:  Goal: Patient will participate in Venous Thrombosis (VTE)/Deep Vein Thrombosis (DVT)Prevention Measures  Outcome: PROGRESSING AS EXPECTED     Problem: Psychosocial Needs:  Goal: Level of anxiety will decrease  Outcome: PROGRESSING AS EXPECTED     Problem: Fluid Volume:  Goal: Will maintain balanced intake and output  Outcome: PROGRESSING AS EXPECTED     Problem: Respiratory:  Goal: Respiratory status will improve  Outcome: PROGRESSING AS EXPECTED     Problem: Bowel/Gastric:  Goal: Will not experience complications related to bowel motility  Outcome: PROGRESSING AS EXPECTED     Problem: Skin Integrity  Goal: Risk for impaired skin integrity will decrease  Outcome: PROGRESSING AS EXPECTED     Problem: Mobility  Goal: Risk for activity intolerance will decrease  Outcome: PROGRESSING AS EXPECTED     Problem: Medication  Goal: Compliance with prescribed medication will improve  Outcome: PROGRESSING AS EXPECTED     Problem: Knowledge Deficit  Goal: Knowledge of disease process/condition, treatment plan, diagnostic tests, and medications will improve  Outcome: PROGRESSING AS EXPECTED     Problem: Discharge Barriers/Planning  Goal: Patient's continuum of care needs will be met  Outcome: PROGRESSING AS EXPECTED     Problem: Alcohol Withdrawal  Goal: Optimal Outcome for the Alcohol Withdrawal Patient  Outcome: PROGRESSING AS EXPECTED     Problem: Knowledge Deficit  Goal: Patient/Significant other demonstrates understanding of disease process, treatment plan,  medications and discharge instructions  Outcome: PROGRESSING AS EXPECTED     Problem: Risk for Infection, Impaired Wound Healing  Goal: Remain free from signs and symptoms of infection  Outcome: PROGRESSING AS EXPECTED     Problem: Risk for Impaired Mobility--Activity Intolerance  Goal: Mobilize and/or Transfer Safely with Maximum Grady  Outcome: PROGRESSING AS EXPECTED  Goal: Activity Level appropriate for Discharge or Transfer  Outcome: PROGRESSING AS EXPECTED     Problem: Oxygenation/Respiratory Function  Goal: Patient will Achieve/Maintain Optimum Respiratory Rate/Effort  Outcome: PROGRESSING AS EXPECTED     Problem: Risk of Aspiration  Goal: Absence of aspiration  Outcome: PROGRESSING AS EXPECTED     Problem: Pain  Goal: Alleviation of Pain or a reduction in pain to the patient's comfort goal  Outcome: PROGRESSING AS EXPECTED     Problem: Hemodynamic Status  Goal: Stable Vital Signs and Fluid Balance  Outcome: PROGRESSING AS EXPECTED     Problem: Nutrition Deficit  Goal: Adequate Food and Fluid Intake  Outcome: PROGRESSING AS EXPECTED     Problem: Self Care Deficit  Goal: Ability to bathe, groom and dress independently or with assistance  Outcome: PROGRESSING AS EXPECTED  Goal: Ability to feed and maintain oral hygiene independently or with assistance  Outcome: PROGRESSING AS EXPECTED  Goal: Ability to toilet independently or with assistance  Outcome: PROGRESSING AS EXPECTED     Problem: Elimination  Goal: Establishment and Maintenance of regular bowel elimination  Outcome: PROGRESSING AS EXPECTED  Goal: Regular urinary elimination  Outcome: PROGRESSING AS EXPECTED

## 2021-02-08 NOTE — DISCHARGE PLANNING
Anticipated Discharge Disposition: Home w/ no needs    Action: Spoke with patient over the phone to discuss IMM.  Patient stated he was ready to go home and would not be appealing.  IMM charted and walked copy of IMM to patient on unit.    Barriers to Discharge: none    Plan: RN CM available for any further dc planning needs.

## 2021-02-08 NOTE — CARE PLAN
Problem: Communication  Goal: The ability to communicate needs accurately and effectively will improve  Outcome: PROGRESSING AS EXPECTED     Problem: Safety  Goal: Will remain free from injury  Outcome: PROGRESSING AS EXPECTED  Goal: Will remain free from falls  Outcome: PROGRESSING AS EXPECTED     Problem: Infection  Goal: Will remain free from infection  Outcome: PROGRESSING AS EXPECTED     Problem: Venous Thromboembolism (VTW)/Deep Vein Thrombosis (DVT) Prevention:  Goal: Patient will participate in Venous Thrombosis (VTE)/Deep Vein Thrombosis (DVT)Prevention Measures  Outcome: PROGRESSING AS EXPECTED     Problem: Psychosocial Needs:  Goal: Level of anxiety will decrease  Outcome: PROGRESSING AS EXPECTED     Problem: Fluid Volume:  Goal: Will maintain balanced intake and output  Outcome: PROGRESSING AS EXPECTED

## 2021-02-08 NOTE — PROGRESS NOTES
IV dc'd.  Discharge instructions given to patient; patient verbalizes understanding, all questions answered.  Copy of DC summary provided, signed copy in chart.  2 prescriptions electronically sent to pt's pharmacy/provided to patient, copies in chart.  Pt states personal belongings are in possession.  Pt escorted off unit by tech without incident.

## 2021-02-08 NOTE — DISCHARGE SUMMARY
Discharge Summary    CHIEF COMPLAINT ON ADMISSION  Chief Complaint   Patient presents with   • Failure to Thrive   • GLF       Reason for Admission  EMS     Admission Date  2021    CODE STATUS  Full Code    HPI & HOSPITAL COURSE  Mr. Askew is a 70-year-old male with PMH significant for alcohol dependence, anxiety and depression with previous suicidal ideation and schizoaffective disorder who was admitted earlier this morning after a fall.  His wife  2 months ago and since then he has been drinking about 1 pint of alcohol per day.  About 1 week ago he completely stopped.  He reports 2 days of shaking symptoms, but denied seizures. He apparently fell down a small set (about 3) stairs at home.  Son lives 4 miles down the road and is concerned about his safety and ability to care for himself.  Apparently he has 16 stairs to get up to his apartment.      Head CT showed no acute abnormalities.  EKG showed sinus rhythm with no ST segment changes.  Labs showed hyponatremia with sodium 126. Diagnostic blood alcohol undetected.  Leukocytosis with WBC 13.5.  UA positive for ketones, small leuks and WBCs.  Patient denies any dysuria, urgency, frequency or hematuria and repeat labs showed resolution of leukocytosis, ABX Dc'd.    He was evaluated by PT/OT who recommended postacute placement versus Group Home placement.  Patient's son and daughter had a family meeting and have ultimately decided they will pick him up today and plan to move him down to [facility] to live with his daughter.  For now, son will provide 24/ supervision. He was given FWW prior to discharge.    According to his med rec, patient is on multiple medications for his history of depression and psychiatric disorder.  I called patient's pharmacy and he has not picked up any prescriptions in 1 year.    Patient was seen and examined prior to discharge.  His affect is flat at his baseline.  He continues to deny SI.  Once he moves to [facility] he will need to  establish with local PCP for further surveillance and management.  Patient and family at bedside verbalized understanding of information and are eager for discharge home today.      Therefore, he is discharged in good and stable condition to home with close outpatient follow-up.    Discharge Date  2/8/2021    FOLLOW UP ITEMS POST DISCHARGE    Discharge Instructions per ADAN Díaz    -Establish with local PCP once you move to [facility]  -Remain abstinent from alcohol    DIET: As tolerated    ACTIVITY: As tolerated    DIAGNOSIS: Weakness    Return to ER if you have uncontrolled chest pain/shortness of breath, dizziness, fever, chills, cough, increased weakness or difficulties ambulating.      DISCHARGE DIAGNOSES  Principal Problem:    Age-related physical debility POA: Yes  Active Problems:    Alcohol dependence (HCC) POA: Yes    Grieving POA: Yes    Severe protein-calorie malnutrition (HCC) POA: Yes  Resolved Problems:    Dehydration POA: Unknown    Hyponatremia POA: Unknown    Hypokalemia POA: Yes      FOLLOW UP  [address] [address] [phone]          MEDICATIONS ON DISCHARGE     Medication List      START taking these medications      Instructions   thiamine 100 MG tablet  Commonly known as: THIAMINE   Take 1 Tab by mouth every day.  Dose: 100 mg        CONTINUE taking these medications      Instructions   aspirin 81 MG EC tablet   Take 1 Tab by mouth.  Dose: 81 mg     atorvastatin 20 MG Tabs  Commonly known as: LIPITOR   Take 1 Tab by mouth every bedtime.  Dose: 20 mg     multivitamin Tabs   Take 1 Tab by mouth every day.  Dose: 1 Tab     sertraline 100 MG Tabs  Commonly known as: Zoloft   Take 2 Tabs by mouth every day.  Dose: 200 mg        STOP taking these medications    haloperidol 5 MG Tabs  Commonly known as: HALDOL     ibuprofen 600 MG Tabs  Commonly known as: MOTRIN     naltrexone 50 MG Tabs  Commonly known as: DEPADE     olanzapine 15 MG  tablet  Commonly known as: ZYPREXA     propranolol 10 MG Tabs  Commonly known as: INDERAL     traZODone 50 MG Tabs  Commonly known as: DESYREL            Allergies  No Known Allergies    DIET  Orders Placed This Encounter   Procedures   • Diet Order Diet: Regular     Standing Status:   Standing     Number of Occurrences:   1     Order Specific Question:   Diet:     Answer:   Regular [1]       ACTIVITY  As tolerated.  Weight bearing as tolerated    CONSULTATIONS  None    PROCEDURES  None    LABORATORY  Lab Results   Component Value Date    SODIUM 133 (L) 02/08/2021    POTASSIUM 3.9 02/08/2021    CHLORIDE 99 02/08/2021    CO2 26 02/08/2021    GLUCOSE 107 (H) 02/08/2021    BUN 21 02/08/2021    CREATININE 0.61 02/08/2021    CREATININE 0.8 12/18/2008        Lab Results   Component Value Date    WBC 10.5 02/08/2021    HEMOGLOBIN 11.1 (L) 02/08/2021    HEMATOCRIT 33.2 (L) 02/08/2021    PLATELETCT 231 02/08/2021        Total time of the discharge process exceeds 41 minutes.    I have performed a physical exam and reviewed and updated ROS and Assessment/Plan today (02/08/21). In review of the previous note there are no changes except as documented above    Please note that this dictation was created using voice recognition software. I have made every reasonable attempt to correct obvious errors, but there may be errors of grammar and possibly content that I did not discover before finalizing the note.    BERTRAM Díaz.

## 2021-02-08 NOTE — PROGRESS NOTES
Assessment completed. Pt A&Ox 4. Respirations are even and unlabored on 2.5L n/c. Pt denies pain at this time. Monitors applied, VS stable, call light and belongings within reach. POC updated (D/C). Pt educated on room and call light, pt verbalized understanding. Communication board updated. Needs met.

## 2021-02-08 NOTE — PROGRESS NOTES
Bedside report received from day nurse. Assumed care of patient. Pt resting comfortably without any sign of distress. A&Ox4, VSS, no complaints at this time. POC reviewed and white board updated, Tele box on, Call light within reach, Bed locked in lowest position.

## 2021-02-08 NOTE — DISCHARGE INSTRUCTIONS
Discharge Instructions    Discharged to home by car with relative. Discharged via wheelchair, hospital escort: Yes.  Special equipment needed: Not Applicable    Be sure to schedule a follow-up appointment with your primary care doctor or any specialists as instructed.     Discharge Plan:   Diet Plan: Discussed  Activity Level: Discussed  Confirmed Follow up Appointment: Appointment Scheduled  Confirmed Symptoms Management: Discussed  Medication Reconciliation Updated: No (Comments)  Influenza Vaccine Indication: Patient Refuses      Failure to Thrive, Adult  Failure to thrive is a group of symptoms that affect adults, including the elderly. These symptoms include eating too little and losing weight. People who have this may not want to be with friends, or they may not want to eat or drink. This condition is not a normal part of getting older.  What are the causes?  · A disease, such as dementia, diabetes, cancer, or lung disease.  · A health problem, such as a vitamin deficiency or a heart problem.  · A disorder, such as sadness (depression).  · A disability.  · Medicines.  · Having tooth or mouth problems.  · Neglect or being treated badly.  · In some cases, the cause may not be known.  What are the signs or symptoms?  · Loss of more than 5% of your body weight.  · Being more tired than normal after an activity.  · Having trouble getting up after sitting.  · Not feeling hungry.  · Not getting out of bed.  · Not wanting to do your normal activities.  · Sadness.  · Getting infections often.  · Bedsores.  · Taking a long time to get better after an infection, injury, or a surgery.  · Weakness.  How is this treated?  Treatment for this condition depends on the cause. It may be treated by:  · Treating a disease or disorder that is causing symptoms.  · Having talk therapy or taking medicine to treat sadness.  · Eating better, such as eating more often or taking nutritional supplements.  · Changing or stopping a  medicine.  · Having physical or occupational therapy.  It often takes a team of doctors to find the right treatment.  Follow these instructions at home:    · Take over-the-counter and prescription medicines only as told by your doctor.  · Eat a healthy diet. Make sure that you eat enough. Ask your doctor how much you should eat.  · Be active. Do exercises that make you stronger (strength training). A physical therapist can help to set up a program that fits you.  · Make sure that you are safe at home.  · Have a plan for what to do if you cannot make decisions for yourself.  Contact a doctor if you:  · Are not able to eat well.  · Are not able to move around.  · Feel very sad.  · Feel very hopeless.  Get help right away if:  · You think about ending your life.  · You cannot eat or drink.  · You do not get out of bed.  · Staying at home is not safe.  · You have a fever.  Summary  · Failure to thrive is a group of symptoms that affect adults, including the elderly.  · Symptoms include eating too little and losing weight.  · Take all medicines only as told by your doctor.  · Eat a healthy diet. Make sure that you eat enough. Ask your doctor how much you should eat.  · Be active. Do exercises that make you stronger. A physical therapist can help to set up a program that is right for you.  This information is not intended to replace advice given to you by your health care provider. Make sure you discuss any questions you have with your health care provider.  Document Released: 12/06/2012 Document Revised: 08/20/2019 Document Reviewed: 08/20/2019  [employer] Patient Education © 2020 [employer]    I understand that a diet low in cholesterol, fat, and sodium is recommended for good health. Unless I have been given specific instructions below for another diet, I accept this instruction as my diet prescription.   Other diet: Regular    Special Instructions: None    · Is patient discharged on Warfarin / Coumadin?   No      Depression / Suicide Risk    As you are discharged from this [facility] facility, it is important to learn how to keep safe from harming yourself.    Recognize the warning signs:  · Abrupt changes in personality, positive or negative- including increase in energy   · Giving away possessions  · Change in eating patterns- significant weight changes-  positive or negative  · Change in sleeping patterns- unable to sleep or sleeping all the time   · Unwillingness or inability to communicate  · Depression  · Unusual sadness, discouragement and loneliness  · Talk of wanting to die  · Neglect of personal appearance   · Rebelliousness- reckless behavior  · Withdrawal from people/activities they love  · Confusion- inability to concentrate     If you or a loved one observes any of these behaviors or has concerns about self-harm, here's what you can do:  · Talk about it- your feelings and reasons for harming yourself  · Remove any means that you might use to hurt yourself (examples: pills, rope, extension cords, firearm)  · Get professional help from the community (Mental Health, Substance Abuse, psychological counseling)  · Do not be alone:Call your Safe Contact- someone whom you trust who will be there for you.  · Call your local CRISIS HOTLINE 940-5201 or 060-729-1489  · Call your local Children's Mobile Crisis Response Team [place] (534) 557-8597 or www.Hotalot  · Call the toll free National Suicide Prevention Hotlines   · National Suicide Prevention Lifeline 782-902-BNAL (5259)  · National Hope Line Network 800-SUICIDE (534-6982)      Discharge Instructions per Soraida Gutierrez A.P.R.N.    -Establish with local PCP once you move to [facility]  -Remain abstinent from alcohol    DIET: As tolerated    ACTIVITY: As tolerated    DIAGNOSIS: Weakness    Return to ER if you have uncontrolled chest pain/shortness of breath, dizziness, fever, chills, cough, increased weakness or difficulties ambulating.

## 2021-02-11 LAB
BACTERIA BLD CULT: NORMAL
BACTERIA BLD CULT: NORMAL
SIGNIFICANT IND 70042: NORMAL
SIGNIFICANT IND 70042: NORMAL
SITE SITE: NORMAL
SITE SITE: NORMAL
SOURCE SOURCE: NORMAL
SOURCE SOURCE: NORMAL

## 2021-03-03 DIAGNOSIS — Z23 NEED FOR VACCINATION: ICD-10-CM

## 2022-11-08 ENCOUNTER — PATIENT MESSAGE (OUTPATIENT)
Dept: HEALTH INFORMATION MANAGEMENT | Facility: OTHER | Age: 72
End: 2022-11-08

## 2024-05-30 NOTE — BH THERAPY
Group Therapy Checklist  Attendance: Attended  Attendance Duration (min):  (90)  Number of Participants: 10  Program/Group: Intensive Outpatient Program  Topics Covered:  (Group Therapy)  Participation: Limited verbal participation, Attentive (Pt stated he was here for drinking and close to losing his wife/family and stated he will start going to AA meetings. )  Affect/Mood Range: Normal range  Affect/Mood Display: Congruent w/content  Cognition: Oriented, Alert  Evidence of Imminent Suicide Risk: No  Evidence of imminent homicide risk: No  Therapeutic Interventions: Emotion clarification  Progress Toward Treatment Goal: Moderate improvement   verbal cues/nonverbal cues (demo/gestures)/1 person assist